# Patient Record
Sex: FEMALE | Race: WHITE | NOT HISPANIC OR LATINO | Employment: OTHER | URBAN - NONMETROPOLITAN AREA
[De-identification: names, ages, dates, MRNs, and addresses within clinical notes are randomized per-mention and may not be internally consistent; named-entity substitution may affect disease eponyms.]

---

## 2019-03-09 ENCOUNTER — LAB (OUTPATIENT)
Dept: LAB | Facility: HOSPITAL | Age: 69
End: 2019-03-09

## 2019-03-09 ENCOUNTER — TRANSCRIBE ORDERS (OUTPATIENT)
Dept: LAB | Facility: HOSPITAL | Age: 69
End: 2019-03-09

## 2019-03-09 DIAGNOSIS — E78.2 MIXED HYPERLIPIDEMIA: ICD-10-CM

## 2019-03-09 DIAGNOSIS — E55.9 VITAMIN D INSUFFICIENCY: ICD-10-CM

## 2019-03-09 DIAGNOSIS — E78.2 MIXED HYPERLIPIDEMIA: Primary | ICD-10-CM

## 2019-03-09 LAB
ALBUMIN SERPL-MCNC: 4.4 G/DL (ref 3.5–5)
ALBUMIN/GLOB SERPL: 1.3 G/DL (ref 1–2)
ALP SERPL-CCNC: 63 U/L (ref 38–126)
ALT SERPL W P-5'-P-CCNC: 19 U/L (ref 13–69)
ANION GAP SERPL CALCULATED.3IONS-SCNC: 10.7 MMOL/L (ref 10–20)
AST SERPL-CCNC: 31 U/L (ref 15–46)
BILIRUB SERPL-MCNC: 0.2 MG/DL (ref 0.2–1.3)
BUN BLD-MCNC: 10 MG/DL (ref 7–20)
BUN/CREAT SERPL: 16.7 (ref 7.1–23.5)
CALCIUM SPEC-SCNC: 9.9 MG/DL (ref 8.4–10.2)
CHLORIDE SERPL-SCNC: 102 MMOL/L (ref 98–107)
CO2 SERPL-SCNC: 31 MMOL/L (ref 26–30)
CREAT BLD-MCNC: 0.6 MG/DL (ref 0.6–1.3)
GFR SERPL CREATININE-BSD FRML MDRD: 99 ML/MIN/1.73
GLOBULIN UR ELPH-MCNC: 3.3 GM/DL
GLUCOSE BLD-MCNC: 84 MG/DL (ref 74–98)
NT-PROBNP SERPL-MCNC: 61.6 PG/ML (ref 0–125)
POTASSIUM BLD-SCNC: 3.7 MMOL/L (ref 3.5–5.1)
PROT SERPL-MCNC: 7.7 G/DL (ref 6.3–8.2)
SODIUM BLD-SCNC: 140 MMOL/L (ref 137–145)

## 2019-03-09 PROCEDURE — 80053 COMPREHEN METABOLIC PANEL: CPT

## 2019-03-09 PROCEDURE — 83880 ASSAY OF NATRIURETIC PEPTIDE: CPT

## 2019-03-09 PROCEDURE — 36415 COLL VENOUS BLD VENIPUNCTURE: CPT

## 2019-03-09 PROCEDURE — 82306 VITAMIN D 25 HYDROXY: CPT

## 2019-03-11 LAB — 25(OH)D3 SERPL-MCNC: 38 NG/ML

## 2019-03-19 ENCOUNTER — TRANSCRIBE ORDERS (OUTPATIENT)
Dept: ADMINISTRATIVE | Facility: HOSPITAL | Age: 69
End: 2019-03-19

## 2019-03-19 ENCOUNTER — HOSPITAL ENCOUNTER (OUTPATIENT)
Dept: GENERAL RADIOLOGY | Facility: HOSPITAL | Age: 69
Discharge: HOME | End: 2019-03-19
Admitting: FAMILY MEDICINE

## 2019-03-19 DIAGNOSIS — M84.30XA STRESS FRACTURE, UNSPECIFIED SITE, INITIAL ENCOUNTER: Primary | ICD-10-CM

## 2019-03-19 PROCEDURE — 73630 X-RAY EXAM OF FOOT: CPT

## 2019-10-03 ENCOUNTER — TELEPHONE (OUTPATIENT)
Dept: NEUROSURGERY | Facility: CLINIC | Age: 69
End: 2019-10-03

## 2019-10-03 NOTE — TELEPHONE ENCOUNTER
Provider:  Chris  Caller: patient  Time of call:   10:08  Phone #:  [phone]  Surgery:  no  Surgery Date:    Last visit:   07/10/14  Next visit: None    BARBARA:         Reason for call:     Patient wants to know if we will order her a tens unit?

## 2020-07-24 ENCOUNTER — APPOINTMENT (OUTPATIENT)
Dept: GENERAL RADIOLOGY | Facility: HOSPITAL | Age: 70
End: 2020-07-24

## 2020-07-24 ENCOUNTER — APPOINTMENT (OUTPATIENT)
Dept: CT IMAGING | Facility: HOSPITAL | Age: 70
End: 2020-07-24

## 2020-07-24 ENCOUNTER — HOSPITAL ENCOUNTER (EMERGENCY)
Facility: HOSPITAL | Age: 70
Discharge: HOME | End: 2020-07-24
Attending: EMERGENCY MEDICINE | Admitting: EMERGENCY MEDICINE

## 2020-07-24 VITALS
HEART RATE: 96 BPM | RESPIRATION RATE: 16 BRPM | DIASTOLIC BLOOD PRESSURE: 87 MMHG | HEIGHT: 65 IN | TEMPERATURE: 98.2 F | OXYGEN SATURATION: 95 % | BODY MASS INDEX: 23.32 KG/M2 | SYSTOLIC BLOOD PRESSURE: 138 MMHG | WEIGHT: 140 LBS

## 2020-07-24 DIAGNOSIS — R11.0 NAUSEA: ICD-10-CM

## 2020-07-24 DIAGNOSIS — Z95.0 PACEMAKER: ICD-10-CM

## 2020-07-24 DIAGNOSIS — R42 EPISODIC LIGHTHEADEDNESS: Primary | ICD-10-CM

## 2020-07-24 LAB
ALBUMIN SERPL-MCNC: 4.2 G/DL (ref 3.5–5.2)
ALBUMIN/GLOB SERPL: 1.1 G/DL
ALP SERPL-CCNC: 78 U/L (ref 39–117)
ALT SERPL W P-5'-P-CCNC: 30 U/L (ref 1–33)
ANION GAP SERPL CALCULATED.3IONS-SCNC: 10 MMOL/L (ref 5–15)
AST SERPL-CCNC: 27 U/L (ref 1–32)
BACTERIA UR QL AUTO: NORMAL /HPF
BASOPHILS # BLD AUTO: 0.05 10*3/MM3 (ref 0–0.2)
BASOPHILS NFR BLD AUTO: 0.6 % (ref 0–1.5)
BILIRUB SERPL-MCNC: 0.2 MG/DL (ref 0–1.2)
BILIRUB UR QL STRIP: NEGATIVE
BUN SERPL-MCNC: 12 MG/DL (ref 8–23)
BUN/CREAT SERPL: 17.4 (ref 7–25)
CALCIUM SPEC-SCNC: 9.5 MG/DL (ref 8.6–10.5)
CHLORIDE SERPL-SCNC: 102 MMOL/L (ref 98–107)
CLARITY UR: CLEAR
CO2 SERPL-SCNC: 27 MMOL/L (ref 22–29)
COLOR UR: YELLOW
CREAT SERPL-MCNC: 0.69 MG/DL (ref 0.57–1)
DEPRECATED RDW RBC AUTO: 42.9 FL (ref 37–54)
EOSINOPHIL # BLD AUTO: 0.11 10*3/MM3 (ref 0–0.4)
EOSINOPHIL NFR BLD AUTO: 1.3 % (ref 0.3–6.2)
ERYTHROCYTE [DISTWIDTH] IN BLOOD BY AUTOMATED COUNT: 12.4 % (ref 12.3–15.4)
GFR SERPL CREATININE-BSD FRML MDRD: 84 ML/MIN/1.73
GLOBULIN UR ELPH-MCNC: 3.7 GM/DL
GLUCOSE SERPL-MCNC: 101 MG/DL (ref 65–99)
GLUCOSE UR STRIP-MCNC: NEGATIVE MG/DL
HCT VFR BLD AUTO: 47.5 % (ref 34–46.6)
HGB BLD-MCNC: 15.2 G/DL (ref 12–15.9)
HGB UR QL STRIP.AUTO: ABNORMAL
HOLD SPECIMEN: NORMAL
HOLD SPECIMEN: NORMAL
HYALINE CASTS UR QL AUTO: NORMAL /LPF
IMM GRANULOCYTES # BLD AUTO: 0.02 10*3/MM3 (ref 0–0.05)
IMM GRANULOCYTES NFR BLD AUTO: 0.2 % (ref 0–0.5)
KETONES UR QL STRIP: NEGATIVE
LEUKOCYTE ESTERASE UR QL STRIP.AUTO: NEGATIVE
LYMPHOCYTES # BLD AUTO: 1.87 10*3/MM3 (ref 0.7–3.1)
LYMPHOCYTES NFR BLD AUTO: 22.1 % (ref 19.6–45.3)
MAGNESIUM SERPL-MCNC: 2.3 MG/DL (ref 1.6–2.4)
MCH RBC QN AUTO: 30.2 PG (ref 26.6–33)
MCHC RBC AUTO-ENTMCNC: 32 G/DL (ref 31.5–35.7)
MCV RBC AUTO: 94.2 FL (ref 79–97)
MONOCYTES # BLD AUTO: 0.54 10*3/MM3 (ref 0.1–0.9)
MONOCYTES NFR BLD AUTO: 6.4 % (ref 5–12)
NEUTROPHILS NFR BLD AUTO: 5.89 10*3/MM3 (ref 1.7–7)
NEUTROPHILS NFR BLD AUTO: 69.4 % (ref 42.7–76)
NITRITE UR QL STRIP: NEGATIVE
NRBC BLD AUTO-RTO: 0 /100 WBC (ref 0–0.2)
PH UR STRIP.AUTO: 5.5 [PH] (ref 5–8)
PLATELET # BLD AUTO: 412 10*3/MM3 (ref 140–450)
PMV BLD AUTO: 8.9 FL (ref 6–12)
POTASSIUM SERPL-SCNC: 4.4 MMOL/L (ref 3.5–5.2)
PROT SERPL-MCNC: 7.9 G/DL (ref 6–8.5)
PROT UR QL STRIP: NEGATIVE
RBC # BLD AUTO: 5.04 10*6/MM3 (ref 3.77–5.28)
RBC # UR: NORMAL /HPF
REF LAB TEST METHOD: NORMAL
SODIUM SERPL-SCNC: 139 MMOL/L (ref 136–145)
SP GR UR STRIP: 1.01 (ref 1–1.03)
SQUAMOUS #/AREA URNS HPF: NORMAL /HPF
TROPONIN T SERPL-MCNC: <0.01 NG/ML (ref 0–0.03)
UROBILINOGEN UR QL STRIP: ABNORMAL
WBC # BLD AUTO: 8.48 10*3/MM3 (ref 3.4–10.8)
WBC UR QL AUTO: NORMAL /HPF
WHOLE BLOOD HOLD SPECIMEN: NORMAL
WHOLE BLOOD HOLD SPECIMEN: NORMAL

## 2020-07-24 PROCEDURE — 71045 X-RAY EXAM CHEST 1 VIEW: CPT

## 2020-07-24 PROCEDURE — 0 IOPAMIDOL PER 1 ML: Performed by: EMERGENCY MEDICINE

## 2020-07-24 PROCEDURE — 80053 COMPREHEN METABOLIC PANEL: CPT | Performed by: EMERGENCY MEDICINE

## 2020-07-24 PROCEDURE — 70450 CT HEAD/BRAIN W/O DYE: CPT

## 2020-07-24 PROCEDURE — 99284 EMERGENCY DEPT VISIT MOD MDM: CPT

## 2020-07-24 PROCEDURE — 71275 CT ANGIOGRAPHY CHEST: CPT

## 2020-07-24 PROCEDURE — 84484 ASSAY OF TROPONIN QUANT: CPT | Performed by: EMERGENCY MEDICINE

## 2020-07-24 PROCEDURE — 85025 COMPLETE CBC W/AUTO DIFF WBC: CPT

## 2020-07-24 PROCEDURE — 81001 URINALYSIS AUTO W/SCOPE: CPT | Performed by: EMERGENCY MEDICINE

## 2020-07-24 PROCEDURE — 83735 ASSAY OF MAGNESIUM: CPT | Performed by: EMERGENCY MEDICINE

## 2020-07-24 PROCEDURE — 93005 ELECTROCARDIOGRAM TRACING: CPT | Performed by: EMERGENCY MEDICINE

## 2020-07-24 PROCEDURE — 93005 ELECTROCARDIOGRAM TRACING: CPT

## 2020-07-24 RX ORDER — ONDANSETRON 4 MG/1
4 TABLET, ORALLY DISINTEGRATING ORAL EVERY 6 HOURS PRN
Qty: 15 TABLET | Refills: 0 | Status: SHIPPED | OUTPATIENT
Start: 2020-07-24 | End: 2020-08-21

## 2020-07-24 RX ORDER — SODIUM CHLORIDE 0.9 % (FLUSH) 0.9 %
10 SYRINGE (ML) INJECTION AS NEEDED
Status: DISCONTINUED | OUTPATIENT
Start: 2020-07-24 | End: 2020-07-24 | Stop reason: HOSPADM

## 2020-07-24 RX ADMIN — SODIUM CHLORIDE 500 ML: 9 INJECTION, SOLUTION INTRAVENOUS at 18:48

## 2020-07-24 RX ADMIN — IOPAMIDOL 70 ML: 755 INJECTION, SOLUTION INTRAVENOUS at 18:56

## 2020-07-25 NOTE — ED PROVIDER NOTES
"Subjective   Pt is a 68 yo female presenting to ED with complaints of dizziness. Pt with hx of recent placement of a pacemaker on 7-14-20 with Dr. Back at [facility]. Per records patient had a 2:1 block and a heart rate in the low 30s. During procedure patient developed a \"small right ventricular perforation\" and a pericardial drain was placed. The drain was removed after 2 days and patient was discharged home on Colchicine. Patient reports she has been doing well until today she has had several episodes of lightheadedness with nausea and some SOB that last a few minutes. Son reports her HR had been running in the 70-80s but today having episodes of in the 90's to 100's. Pt denies any current dizziness or CP in ED. She denies syncope, fever, chills, cough, V/D, abdominal pain or leg swelling. She denies redness, swelling or pain around the pacemaker site. She denies hx of heart cath or cardiac stents. No reports of confusion, slurred speech, vision changes, numbness, tingling or weakness. Patient and son explain they are planning on f/u with [facility] instead of going back to Dr. Back. PMHx significant for seasonal allergies and heart block with pacemaker. Pt denies tobacco, drug or ETOH use.       History provided by:  Patient, medical records and relative  Dizziness   Quality:  Lightheadedness  Severity:  Mild  Onset quality:  Gradual  Duration:  1 day  Timing:  Intermittent  Progression:  Waxing and waning  Chronicity:  New  Relieved by:  Nothing  Worsened by:  Nothing  Ineffective treatments:  None tried  Associated symptoms: nausea    Associated symptoms: no blood in stool, no chest pain, no diarrhea, no headaches, no palpitations, no shortness of breath, no syncope, no tinnitus, no vision changes, no vomiting and no weakness    Risk factors: no heart disease and no hx of stroke        Review of Systems   Constitutional: Negative for fever.   HENT: Negative for congestion, sinus pain, sore " throat and tinnitus.    Eyes: Negative for visual disturbance.   Respiratory: Negative for shortness of breath.    Cardiovascular: Negative for chest pain, palpitations, leg swelling and syncope.   Gastrointestinal: Positive for nausea. Negative for abdominal pain, blood in stool, diarrhea and vomiting.   Genitourinary: Negative for difficulty urinating, dysuria, flank pain and frequency.   Musculoskeletal: Negative for arthralgias and back pain.   Skin: Negative for wound.   Neurological: Positive for dizziness. Negative for syncope, speech difficulty, weakness, numbness and headaches.   Psychiatric/Behavioral: Negative for confusion.   All other systems reviewed and are negative.      No past medical history on file.    No Known Allergies    No past surgical history on file.    No family history on file.    Social History     Socioeconomic History   • Marital status:      Spouse name: Not on file   • Number of children: Not on file   • Years of education: Not on file   • Highest education level: Not on file           Objective   Physical Exam   Constitutional: She is oriented to person, place, and time. Vital signs are normal. She appears well-developed.   HENT:   Head: Atraumatic.   Nose: Nose normal.   Mouth/Throat: Mucous membranes are normal.   Eyes: Pupils are equal, round, and reactive to light. Conjunctivae, EOM and lids are normal.   Neck: Normal range of motion. Neck supple.   Cardiovascular: Normal rate, regular rhythm and normal heart sounds.   Pulmonary/Chest: Effort normal and breath sounds normal. She has no wheezes.   Pacemaker site left chest healing well. No erythema or edema.    Abdominal: Soft. She exhibits no distension. There is no tenderness. There is no rebound and no guarding.   Musculoskeletal: Normal range of motion. She exhibits no edema or tenderness.   Neurological: She is alert and oriented to person, place, and time. No sensory deficit.   Skin: Skin is warm and dry. No rash  noted. No erythema.   Psychiatric: She has a normal mood and affect. Her speech is normal and behavior is normal.   Nursing note and vitals reviewed.      Procedures           ED Course      Discussed patient with Dr. Abbott who is agreeable with ED course.     Consulted Cardiology Dr. Iverson. He reviewed patients ED workup, EKG and CTA. He recommended outpatient f/u with cardiology.     Re-examined patient several times in ED. Pt resting and feeling better. No dizziness or CP in ED. Patient wanting to go home. Will dc home and refer to Cardiology.     Recent Results (from the past 24 hour(s))   Comprehensive Metabolic Panel    Collection Time: 07/24/20  5:18 PM   Result Value Ref Range    Glucose 101 (H) 65 - 99 mg/dL    BUN 12 8 - 23 mg/dL    Creatinine 0.69 0.57 - 1.00 mg/dL    Sodium 139 136 - 145 mmol/L    Potassium 4.4 3.5 - 5.2 mmol/L    Chloride 102 98 - 107 mmol/L    CO2 27.0 22.0 - 29.0 mmol/L    Calcium 9.5 8.6 - 10.5 mg/dL    Total Protein 7.9 6.0 - 8.5 g/dL    Albumin 4.20 3.50 - 5.20 g/dL    ALT (SGPT) 30 1 - 33 U/L    AST (SGOT) 27 1 - 32 U/L    Alkaline Phosphatase 78 39 - 117 U/L    Total Bilirubin 0.2 0.0 - 1.2 mg/dL    eGFR Non African Amer 84 >60 mL/min/1.73    Globulin 3.7 gm/dL    A/G Ratio 1.1 g/dL    BUN/Creatinine Ratio 17.4 7.0 - 25.0    Anion Gap 10.0 5.0 - 15.0 mmol/L   Troponin    Collection Time: 07/24/20  5:18 PM   Result Value Ref Range    Troponin T <0.010 0.000 - 0.030 ng/mL   Magnesium    Collection Time: 07/24/20  5:18 PM   Result Value Ref Range    Magnesium 2.3 1.6 - 2.4 mg/dL   Light Blue Top    Collection Time: 07/24/20  5:18 PM   Result Value Ref Range    Extra Tube hold for add-on    Green Top (Gel)    Collection Time: 07/24/20  5:18 PM   Result Value Ref Range    Extra Tube Hold for add-ons.    Lavender Top    Collection Time: 07/24/20  5:18 PM   Result Value Ref Range    Extra Tube hold for add-on    Gold Top - SST    Collection Time: 07/24/20  5:18 PM   Result Value  Ref Range    Extra Tube Hold for add-ons.    CBC Auto Differential    Collection Time: 07/24/20  5:18 PM   Result Value Ref Range    WBC 8.48 3.40 - 10.80 10*3/mm3    RBC 5.04 3.77 - 5.28 10*6/mm3    Hemoglobin 15.2 12.0 - 15.9 g/dL    Hematocrit 47.5 (H) 34.0 - 46.6 %    MCV 94.2 79.0 - 97.0 fL    MCH 30.2 26.6 - 33.0 pg    MCHC 32.0 31.5 - 35.7 g/dL    RDW 12.4 12.3 - 15.4 %    RDW-SD 42.9 37.0 - 54.0 fl    MPV 8.9 6.0 - 12.0 fL    Platelets 412 140 - 450 10*3/mm3    Neutrophil % 69.4 42.7 - 76.0 %    Lymphocyte % 22.1 19.6 - 45.3 %    Monocyte % 6.4 5.0 - 12.0 %    Eosinophil % 1.3 0.3 - 6.2 %    Basophil % 0.6 0.0 - 1.5 %    Immature Grans % 0.2 0.0 - 0.5 %    Neutrophils, Absolute 5.89 1.70 - 7.00 10*3/mm3    Lymphocytes, Absolute 1.87 0.70 - 3.10 10*3/mm3    Monocytes, Absolute 0.54 0.10 - 0.90 10*3/mm3    Eosinophils, Absolute 0.11 0.00 - 0.40 10*3/mm3    Basophils, Absolute 0.05 0.00 - 0.20 10*3/mm3    Immature Grans, Absolute 0.02 0.00 - 0.05 10*3/mm3    nRBC 0.0 0.0 - 0.2 /100 WBC   Urinalysis With Microscopic If Indicated (No Culture) - Urine, Clean Catch    Collection Time: 07/24/20  5:36 PM   Result Value Ref Range    Color, UA Yellow Yellow, Straw    Appearance, UA Clear Clear    pH, UA 5.5 5.0 - 8.0    Specific Gravity, UA 1.008 1.001 - 1.030    Glucose, UA Negative Negative    Ketones, UA Negative Negative    Bilirubin, UA Negative Negative    Blood, UA Trace (A) Negative    Protein, UA Negative Negative    Leuk Esterase, UA Negative Negative    Nitrite, UA Negative Negative    Urobilinogen, UA 0.2 E.U./dL 0.2 - 1.0 E.U./dL   Urinalysis, Microscopic Only - Urine, Clean Catch    Collection Time: 07/24/20  5:36 PM   Result Value Ref Range    RBC, UA 0-2 None Seen, 0-2 /HPF    WBC, UA None Seen None Seen, 0-2 /HPF    Bacteria, UA None Seen None Seen, Trace /HPF    Squamous Epithelial Cells, UA None Seen None Seen, 0-2 /HPF    Hyaline Casts, UA None Seen 0 - 6 /LPF    Methodology Automated Microscopy       Note: In addition to lab results from this visit, the labs listed above may include labs taken at another facility or during a different encounter within the last 24 hours. Please correlate lab times with ED admission and discharge times for further clarification of the services performed during this visit.    CT Head Without Contrast   Final Result   Normal, negative unenhanced head CT.               Signer Name: PAVITHRA Strickland MD    Signed: 7/24/2020 7:34 PM    Workstation Name: [facility]     Radiology [facility]      CT Angiogram Chest   Final Result   Pacemaker over the left anterior chest with lead in expected position.      No acute intrathoracic abnormality. In particular no evidence of pulmonary embolus.      Signer Name: PAVITHRA Strickland MD    Signed: 7/24/2020 7:33 PM    Workstation Name: [facility]      XR Chest 1 View   Preliminary Result   No evidence of pneumothorax or other active chest disease.       DICTATED:   07/24/2020   EDITED/ls :   07/24/2020                  Vitals:    07/24/20 2029 07/24/20 2030 07/24/20 2100 07/24/20 2130   BP:  131/81 141/81 138/87   BP Location:       Patient Position:       Pulse: 102  92 96   Resp:       Temp:       TempSrc:       SpO2: 94%  95% 95%   Weight:       Height:         Medications   sodium chloride 0.9 % flush 10 mL (has no administration in time range)   sodium chloride 0.9 % bolus 500 mL (0 mL Intravenous Stopped 7/24/20 1929)   iopamidol (ISOVUE-370) 76 % injection 100 mL (70 mL Intravenous Given 7/24/20 1856)     ECG/EMG Results (last 24 hours)     Procedure Component Value Units Date/Time    ECG 12 Lead [[id number]] Collected:  07/24/20 1718     Updated:  07/24/20 1718        ECG 12 Lead               COVID-19 RISK SCREEN     1. Has the patient had close contact without PPE with a lab confirmed COVID-19 (+) person or a person under investigation (PUI) for COVID-19 infection?  -- No      2. Has the patient had respiratory symptoms, worsened/new cough and/or SOA, unexplained fever, or sudden loss of smell and/or taste in the past 7 days? --  No    3. Does the patient have baseline higher exposure risk such as working in healthcare field, currently residing in healthcare facility, or ongoing hemodialysis?  --  No          DISCHARGE    Patient discharged in stable condition.    Reviewed implications of results, diagnosis, meds, responsibility to follow up, warning signs and symptoms of possible worsening, potential complications and reasons to return to ER.    Patient/Family voiced understanding of above instructions.    Discussed plan for discharge, as there is no emergent indication for admission.  Pt/family is agreeable and understands need for follow up and possible repeat testing.  Pt/family is aware that discharge does not mean that nothing is wrong but that it indicates no emergency is currently present that requires admission and they must continue care with follow-up as given below or with a physician of their choice.     FOLLOW-UP  Kajal Snyder MD  [address] [place] [address]  [address]  [facility] [address] [phone]    Schedule an appointment as soon as possible for a visit       Gerard Iverson MD  [address]  [facility] [address]  [place] [id number]  [phone]    Schedule an appointment as soon as possible for a visit       [facility] Emergency Department  [address] [facility] [id number] [phone]    If symptoms worsen    [facility]  [address] [place], [place] [address]  [facility] [address] [phone]             Medication List      New Prescriptions    ondansetron ODT 4 MG disintegrating tablet  Commonly known as:  ZOFRAN-ODT  Place 1 tablet on the tongue Every 6 (Six) Hours As Needed for Nausea for   up to 15 doses.                                          MDM    Final diagnoses:   Episodic lightheadedness   Pacemaker    Nausea            Suzette Huff PA  07/24/20 [id number]

## 2020-08-03 NOTE — PROGRESS NOTES
[facility]  [facility] Valve Center      08/04/2020         Haven Koenig  [address] [place] [address]  [unfilled]    1950    Kajal Snyder MD    Haven Koenig is a 69 y.o. female.      Subjective:     Chief Complaint:  Dizziness and Establish Care       HPI   69-year-old female with history of symptomatic bradycardia/heart block status post permanent pacemaker on 7/14/2020 by Dr. Back at [facility] who presents today for evaluation of dizziness at the request of Dr. Abbott.  Patient presented the ED on 7/24 with complaints of dizziness.  Patient had permanent pacemaker on 7/14/2020 for 2-1 heart block with a heart rate in the low 30s.  The procedure was complicated by a small right ventricular perforation and a pericardial drain was placed.  The pain was removed after 2 days the patient was discharged home on colchicine.  Patient was discharged feeling well until the day of the ED visit when she started having several episodes of lightheadedness with associated nausea and shortness of breath.  CTA chest showed normal lead position with no other significant abnormalities.  CT head negative.  She reports that dizziness has now resolved.  She has no shortness of breath or chest pain.  She has some residual soreness at the pacemaker site.  Overall, she is very active and healthy.  Was walking 3 miles a day prior to symptomatic bradycardia.  She has went on a 1 mile walk and is tolerated it well.  Her daughter is with her today reports that the only reason for the visit is to get established with Dr. Iverson for a second opinion    Patient Active Problem List   Diagnosis   • Pericardial effusion, acute   • High degree atrioventricular block       Past Medical History:   Diagnosis Date   • Arrhythmia    • H/O active rheumatic fever    • Pacemaker    • Wears glasses        Past Surgical History:   Procedure Laterality Date   • APPENDECTOMY     • BACK SURGERY  2015   • CATARACT  EXTRACTION     • TONSILLECTOMY         Family History   Problem Relation Age of Onset   • No Known Problems Father    • Arrhythmia Brother    • Cancer Brother    • No Known Problems Maternal Grandfather    • No Known Problems Paternal Grandmother    • Arrhythmia Paternal Grandfather    • Stroke Paternal Grandfather        Social History     Socioeconomic History   • Marital status:      Spouse name: Not on file   • Number of children: Not on file   • Years of education: Not on file   • Highest education level: Not on file   Tobacco Use   • Smoking status: Never Smoker   • Smokeless tobacco: Never Used   Substance and Sexual Activity   • Alcohol use: Never     Frequency: Never   • Drug use: Defer   • Sexual activity: Defer   Social History Narrative    Caffeine coffee 3 servings dt pepsi and dt coke and tea         No Known Allergies      Current Outpatient Medications:   •  loratadine (Claritin) 10 MG tablet, Take 10 mg by mouth Daily., Disp: , Rfl:   •  ondansetron ODT (ZOFRAN-ODT) 4 MG disintegrating tablet, Place 1 tablet on the tongue Every 6 (Six) Hours As Needed for Nausea for up to 15 doses., Disp: 15 tablet, Rfl: 0  •  venlafaxine XR (EFFEXOR-XR) 75 MG 24 hr capsule, Take 75 mg by mouth Daily., Disp: , Rfl:     The following portions of the patient's history were reviewed today and updated as appropriate: allergies, current medications, past family history, past medical history, past social history, past surgical history and problem list     Review of Systems   Constitution: Negative for chills and fever.   HENT: Negative.    Eyes: Negative.    Cardiovascular: Negative for chest pain, claudication, cyanosis, dyspnea on exertion, irregular heartbeat, leg swelling, near-syncope, orthopnea, palpitations, paroxysmal nocturnal dyspnea and syncope.   Respiratory: Negative for cough, shortness of breath and snoring.    Endocrine: Negative.    Hematologic/Lymphatic: Does not bruise/bleed easily.   Skin:  "Negative for poor wound healing.   Musculoskeletal: Negative.    Gastrointestinal: Negative for abdominal pain, heartburn, hematemesis, melena, nausea and vomiting.   Genitourinary: Negative.  Negative for hematuria.   Neurological: Negative for dizziness and light-headedness.   Psychiatric/Behavioral: Negative.    Allergic/Immunologic: Negative.        Objective:     Vitals:    08/04/20 1502 08/04/20 1506 08/04/20 1507   BP: 138/73 140/68 168/75   BP Location: Right arm Left arm Left arm   Patient Position: Sitting Standing Sitting   Cuff Size: Adult Adult Adult   Pulse: 88 94 82   Resp:   20   Temp:   97.1 °F (36.2 °C)   TempSrc:   Temporal   SpO2: 97% 98% 98%   Weight:   64 kg (141 lb)   Height:   165.1 cm (65\")       Body mass index is 23.46 kg/m².    Physical Exam   Constitutional: She is oriented to person, place, and time. She appears well-developed and well-nourished. No distress.   HENT:   Head: Normocephalic.   Eyes: Pupils are equal, round, and reactive to light. Conjunctivae are normal.   Neck: Neck supple. No JVD present. No thyromegaly present.   Cardiovascular: Normal rate, regular rhythm, normal heart sounds and intact distal pulses. Exam reveals no gallop and no friction rub.   No murmur heard.  Pulmonary/Chest: Effort normal and breath sounds normal. No respiratory distress. She has no wheezes. She has no rales. She exhibits no tenderness.   Abdominal: Soft. Bowel sounds are normal.   Musculoskeletal: Normal range of motion. She exhibits no edema.   Neurological: She is alert and oriented to person, place, and time.   Skin: Skin is warm and dry.   Pacemaker site is clean,dry and intact   Psychiatric: She has a normal mood and affect. Her behavior is normal. Thought content normal.   Vitals reviewed.      Lab and Diagnostic Review:  Echo 7/14/2020 showed EF 65%.  A pericardial effusion was noted during pacemaker insertion measuring 1.28 cm.  Pericardiocentesis performed with no evidence of residual " effusion post procedure.    Echo 7/15/2020 showed EF 55 to 60% with no evidence of pericardial effusion.  RV pacing induced septal wobble noted.  No significant valvulopathy  Results for orders placed or performed during the hospital encounter of 07/24/20   Comprehensive Metabolic Panel   Result Value Ref Range    Glucose 101 (H) 65 - 99 mg/dL    BUN 12 8 - 23 mg/dL    Creatinine 0.69 0.57 - 1.00 mg/dL    Sodium 139 136 - 145 mmol/L    Potassium 4.4 3.5 - 5.2 mmol/L    Chloride 102 98 - 107 mmol/L    CO2 27.0 22.0 - 29.0 mmol/L    Calcium 9.5 8.6 - 10.5 mg/dL    Total Protein 7.9 6.0 - 8.5 g/dL    Albumin 4.20 3.50 - 5.20 g/dL    ALT (SGPT) 30 1 - 33 U/L    AST (SGOT) 27 1 - 32 U/L    Alkaline Phosphatase 78 39 - 117 U/L    Total Bilirubin 0.2 0.0 - 1.2 mg/dL    eGFR Non African Amer 84 >60 mL/min/1.73    Globulin 3.7 gm/dL    A/G Ratio 1.1 g/dL    BUN/Creatinine Ratio 17.4 7.0 - 25.0    Anion Gap 10.0 5.0 - 15.0 mmol/L   Troponin   Result Value Ref Range    Troponin T <0.010 0.000 - 0.030 ng/mL   Magnesium   Result Value Ref Range    Magnesium 2.3 1.6 - 2.4 mg/dL   Urinalysis With Microscopic If Indicated (No Culture) - Urine, Clean Catch   Result Value Ref Range    Color, UA Yellow Yellow, Straw    Appearance, UA Clear Clear    pH, UA 5.5 5.0 - 8.0    Specific Gravity, UA 1.008 1.001 - 1.030    Glucose, UA Negative Negative    Ketones, UA Negative Negative    Bilirubin, UA Negative Negative    Blood, UA Trace (A) Negative    Protein, UA Negative Negative    Leuk Esterase, UA Negative Negative    Nitrite, UA Negative Negative    Urobilinogen, UA 0.2 E.U./dL 0.2 - 1.0 E.U./dL   CBC Auto Differential   Result Value Ref Range    WBC 8.48 3.40 - 10.80 10*3/mm3    RBC 5.04 3.77 - 5.28 10*6/mm3    Hemoglobin 15.2 12.0 - 15.9 g/dL    Hematocrit 47.5 (H) 34.0 - 46.6 %    MCV 94.2 79.0 - 97.0 fL    MCH 30.2 26.6 - 33.0 pg    MCHC 32.0 31.5 - 35.7 g/dL    RDW 12.4 12.3 - 15.4 %    RDW-SD 42.9 37.0 - 54.0 fl    MPV 8.9 6.0 -  12.0 fL    Platelets 412 140 - 450 10*3/mm3    Neutrophil % 69.4 42.7 - 76.0 %    Lymphocyte % 22.1 19.6 - 45.3 %    Monocyte % 6.4 5.0 - 12.0 %    Eosinophil % 1.3 0.3 - 6.2 %    Basophil % 0.6 0.0 - 1.5 %    Immature Grans % 0.2 0.0 - 0.5 %    Neutrophils, Absolute 5.89 1.70 - 7.00 10*3/mm3    Lymphocytes, Absolute 1.87 0.70 - 3.10 10*3/mm3    Monocytes, Absolute 0.54 0.10 - 0.90 10*3/mm3    Eosinophils, Absolute 0.11 0.00 - 0.40 10*3/mm3    Basophils, Absolute 0.05 0.00 - 0.20 10*3/mm3    Immature Grans, Absolute 0.02 0.00 - 0.05 10*3/mm3    nRBC 0.0 0.0 - 0.2 /100 WBC   Urinalysis, Microscopic Only - Urine, Clean Catch   Result Value Ref Range    RBC, UA 0-2 None Seen, 0-2 /HPF    WBC, UA None Seen None Seen, 0-2 /HPF    Bacteria, UA None Seen None Seen, Trace /HPF    Squamous Epithelial Cells, UA None Seen None Seen, 0-2 /HPF    Hyaline Casts, UA None Seen 0 - 6 /LPF    Methodology Automated Microscopy      EKG 7/24/20 Atrial-sensed ventricular-paced rhythm with occasional premature  ventricular complexes  Abnormal ECG  No previous ECGs available    [employer] Device check showed normal functioning device with no events.  87% V paced, less than 1% a paced  Assessment and Plan:   1. Dizziness  Symptoms currently resolved, no events on PPM    2. High degree atrioventricular block/symptomatic bradycardia  S/p PPM    3. Pericardial effusion, acute  Small RV perforation during PPM implant s/p pericardial drain with resolution of pericardial effusion per echo    4. Elevated blood-pressure reading, without diagnosis of hypertension  Elevated today but she reports that she is been checking at home and systolic blood pressures are mostly in the 130s.  Continue to monitor      We will get patient established with Dr.McCotter VALLECILLO with [facility]          It has been a pleasure to participate in the care of this patient.  Patient was instructed to call the [facility] Center with any questions, concerns, or  worsening symptoms.    *Please note that portions of this note were completed with a voice recognition program. Efforts were made to edit the dictations, but occasionally words are mistranscribed.

## 2020-08-04 ENCOUNTER — OFFICE VISIT (OUTPATIENT)
Dept: CARDIOLOGY | Facility: HOSPITAL | Age: 70
End: 2020-08-04

## 2020-08-04 VITALS
HEART RATE: 82 BPM | SYSTOLIC BLOOD PRESSURE: 168 MMHG | HEIGHT: 65 IN | TEMPERATURE: 97.1 F | WEIGHT: 141 LBS | BODY MASS INDEX: 23.49 KG/M2 | OXYGEN SATURATION: 98 % | RESPIRATION RATE: 20 BRPM | DIASTOLIC BLOOD PRESSURE: 75 MMHG

## 2020-08-04 DIAGNOSIS — I44.39 HIGH DEGREE ATRIOVENTRICULAR BLOCK: ICD-10-CM

## 2020-08-04 DIAGNOSIS — R03.0 ELEVATED BLOOD-PRESSURE READING, WITHOUT DIAGNOSIS OF HYPERTENSION: ICD-10-CM

## 2020-08-04 DIAGNOSIS — I30.9 PERICARDIAL EFFUSION, ACUTE: ICD-10-CM

## 2020-08-04 DIAGNOSIS — R42 DIZZINESS: Primary | ICD-10-CM

## 2020-08-04 PROCEDURE — 99203 OFFICE O/P NEW LOW 30 MIN: CPT | Performed by: NURSE PRACTITIONER

## 2020-08-04 RX ORDER — LORATADINE 10 MG/1
10 TABLET ORAL DAILY
COMMUNITY

## 2020-08-04 RX ORDER — VENLAFAXINE HYDROCHLORIDE 75 MG/1
75 CAPSULE, EXTENDED RELEASE ORAL DAILY
COMMUNITY

## 2020-08-05 DIAGNOSIS — Z95.0 PRESENCE OF CARDIAC PACEMAKER: ICD-10-CM

## 2020-08-05 DIAGNOSIS — I44.39 HIGH DEGREE ATRIOVENTRICULAR BLOCK: Primary | ICD-10-CM

## 2020-08-05 DIAGNOSIS — I30.9 PERICARDIAL EFFUSION, ACUTE: ICD-10-CM

## 2020-08-21 ENCOUNTER — TELEPHONE (OUTPATIENT)
Dept: CARDIOLOGY | Facility: CLINIC | Age: 70
End: 2020-08-21

## 2020-08-21 ENCOUNTER — CONSULT (OUTPATIENT)
Dept: CARDIOLOGY | Facility: CLINIC | Age: 70
End: 2020-08-21

## 2020-08-21 VITALS
BODY MASS INDEX: 25.1 KG/M2 | HEIGHT: 64 IN | HEART RATE: 70 BPM | SYSTOLIC BLOOD PRESSURE: 118 MMHG | WEIGHT: 147 LBS | DIASTOLIC BLOOD PRESSURE: 74 MMHG

## 2020-08-21 DIAGNOSIS — Z95.0 PRESENCE OF CARDIAC PACEMAKER: ICD-10-CM

## 2020-08-21 DIAGNOSIS — I44.39 HIGH DEGREE ATRIOVENTRICULAR BLOCK: ICD-10-CM

## 2020-08-21 DIAGNOSIS — I30.9 PERICARDIAL EFFUSION, ACUTE: ICD-10-CM

## 2020-08-21 PROCEDURE — 93280 PM DEVICE PROGR EVAL DUAL: CPT | Performed by: INTERNAL MEDICINE

## 2020-08-21 PROCEDURE — 99204 OFFICE O/P NEW MOD 45 MIN: CPT | Performed by: INTERNAL MEDICINE

## 2020-08-21 RX ORDER — HYDROXYZINE HYDROCHLORIDE 25 MG/1
25 TABLET, FILM COATED ORAL 3 TIMES DAILY PRN
COMMUNITY
End: 2020-12-06

## 2020-08-21 RX ORDER — PROPRANOLOL HYDROCHLORIDE 20 MG/1
20 TABLET ORAL DAILY
COMMUNITY

## 2020-08-21 NOTE — PROGRESS NOTES
Haven Koenig  1950  PCP: Kajal Snyder MD    SUBJECTIVE:   Haven Koenig is a 69 y.o. female seen for a consultation visit regarding the following:     Chief Complaint:   Chief Complaint   Patient presents with   • Dizziness   • High degree atrioventricular block          Consultation is requested by BASIA Lara* for evaluation of Dizziness and High degree atrioventricular block        History:  This is 69-year-old female with history of symptomatic bradycardia/heart block status post permanent pacemaker on 7/14/2020 by Dr. Back at [facility].  Patient presented the ED on 7/24/2020 with complaints of dizziness.  Patient had permanent pacemaker on 7/14/2020 for 2-1 heart block with a heart rate in the low 30s.  The procedure was complicated by a small right ventricular perforation and a pericardial drain was placed.  The drain was removed after 2 days the patient was discharged home on colchicine.  Patient was discharged feeling well until the day of the ED visit when she started having several episodes of lightheadedness with associated nausea and shortness of breath.  CTA chest showed normal lead position with no other significant abnormalities.  CT head negative.  She reports that dizziness has now resolved.  She has no shortness of breath or chest pain.  She has some residual soreness at the pacemaker site.  Overall, she is very active and healthy.      Cardiac PMH: (Old records have been reviewed and summarized below)  1.  Transient AV/complete heart block  2.  [employer] dual-chamber pacemaker placement-July 2020 - by Dr. Back at [facility].  3.  Pacemaker placement complication with perforation of the RV lead requiring pericardial drain    Past Medical History, Past Surgical History, Family history, Social History, and Medications were all reviewed with the patient today and updated as necessary.       Current Outpatient Medications:   •  hydrOXYzine (ATARAX)  25 MG tablet, Take 25 mg by mouth 3 (Three) Times a Day As Needed for Itching., Disp: , Rfl:   •  loratadine (Claritin) 10 MG tablet, Take 10 mg by mouth Daily., Disp: , Rfl:   •  Multiple Vitamin (MULTI-VITAMIN DAILY PO), Take  by mouth., Disp: , Rfl:   •  propranolol (INDERAL) 20 MG tablet, Take 20 mg by mouth 3 (Three) Times a Day., Disp: , Rfl:   •  venlafaxine XR (EFFEXOR-XR) 75 MG 24 hr capsule, Take 75 mg by mouth Daily., Disp: , Rfl:     No Known Allergies      Past Medical History:   Diagnosis Date   • H/O active rheumatic fever    • Heart block    • Pacemaker    • Wears glasses      Past Surgical History:   Procedure Laterality Date   • APPENDECTOMY     • BACK SURGERY  2015   • CATARACT EXTRACTION     • PACEMAKER IMPLANTATION  2020   • TONSILLECTOMY       Family History   Problem Relation Age of Onset   • No Known Problems Father    • Arrhythmia Brother    • Cancer Brother    • No Known Problems Maternal Grandfather    • No Known Problems Paternal Grandmother    • Arrhythmia Paternal Grandfather    • Stroke Paternal Grandfather      Social History     Tobacco Use   • Smoking status: Never Smoker   • Smokeless tobacco: Never Used   Substance Use Topics   • Alcohol use: Never     Frequency: Never       ROS:    General: no recent weight loss/gain, weakness or fatigue  Skin: no rashes, lumps, or other skin changes  HEENT: no dizziness, lightheadedness, or vision changes  Respiratory: no cough or hemoptysis  Cardiovascular: No palpitations, or tachycardia  Gastrointestinal: no black/tarry stools or diarrhea  Urinary: no change in frequency or urgency  Peripheral Vascular: no claudication or leg cramps  Musculoskeletal: no muscle or joint pain/stiffness  Psychiatric: no depression or excessive stress  Neurological: no sensory or motor loss, no syncope  Hematologic: no anemia, easy bruising or bleeding  Endocrine: no thyroid problems, nor heat or cold intolerance       PHYSICAL EXAM:   /74 (BP Location:  "Left arm, Patient Position: Sitting)   Pulse 70   Ht 162.6 cm (64\")   Wt 66.7 kg (147 lb)   BMI 25.23 kg/m²      Wt Readings from Last 5 Encounters:   08/21/20 66.7 kg (147 lb)   08/04/20 64 kg (141 lb)   07/24/20 63.5 kg (140 lb)     BP Readings from Last 5 Encounters:   08/21/20 118/74   08/04/20 168/75   07/24/20 138/87       General-Well Nourished, Well developed  Eyes - PERRLA  Neck- supple, No mass  CV- regular rate and rhythm, no MRG  Lung- clear bilaterally  Abd- soft, +BS  Musc/skel - Norm strength and range of motion  Skin- warm and dry  Neuro - Alert & Oriented x 3, appropriate mood.    Medical problems and test results were reviewed with the patient today.     Results for orders placed or performed during the hospital encounter of 07/24/20   Comprehensive Metabolic Panel   Result Value Ref Range    Glucose 101 (H) 65 - 99 mg/dL    BUN 12 8 - 23 mg/dL    Creatinine 0.69 0.57 - 1.00 mg/dL    Sodium 139 136 - 145 mmol/L    Potassium 4.4 3.5 - 5.2 mmol/L    Chloride 102 98 - 107 mmol/L    CO2 27.0 22.0 - 29.0 mmol/L    Calcium 9.5 8.6 - 10.5 mg/dL    Total Protein 7.9 6.0 - 8.5 g/dL    Albumin 4.20 3.50 - 5.20 g/dL    ALT (SGPT) 30 1 - 33 U/L    AST (SGOT) 27 1 - 32 U/L    Alkaline Phosphatase 78 39 - 117 U/L    Total Bilirubin 0.2 0.0 - 1.2 mg/dL    eGFR Non African Amer 84 >60 mL/min/1.73    Globulin 3.7 gm/dL    A/G Ratio 1.1 g/dL    BUN/Creatinine Ratio 17.4 7.0 - 25.0    Anion Gap 10.0 5.0 - 15.0 mmol/L   Troponin   Result Value Ref Range    Troponin T <0.010 0.000 - 0.030 ng/mL   Magnesium   Result Value Ref Range    Magnesium 2.3 1.6 - 2.4 mg/dL   Urinalysis With Microscopic If Indicated (No Culture) - Urine, Clean Catch   Result Value Ref Range    Color, UA Yellow Yellow, Straw    Appearance, UA Clear Clear    pH, UA 5.5 5.0 - 8.0    Specific Gravity, UA 1.008 1.001 - 1.030    Glucose, UA Negative Negative    Ketones, UA Negative Negative    Bilirubin, UA Negative Negative    Blood, UA Trace (A) " Negative    Protein, UA Negative Negative    Leuk Esterase, UA Negative Negative    Nitrite, UA Negative Negative    Urobilinogen, UA 0.2 E.U./dL 0.2 - 1.0 E.U./dL   CBC Auto Differential   Result Value Ref Range    WBC 8.48 3.40 - 10.80 10*3/mm3    RBC 5.04 3.77 - 5.28 10*6/mm3    Hemoglobin 15.2 12.0 - 15.9 g/dL    Hematocrit 47.5 (H) 34.0 - 46.6 %    MCV 94.2 79.0 - 97.0 fL    MCH 30.2 26.6 - 33.0 pg    MCHC 32.0 31.5 - 35.7 g/dL    RDW 12.4 12.3 - 15.4 %    RDW-SD 42.9 37.0 - 54.0 fl    MPV 8.9 6.0 - 12.0 fL    Platelets 412 140 - 450 10*3/mm3    Neutrophil % 69.4 42.7 - 76.0 %    Lymphocyte % 22.1 19.6 - 45.3 %    Monocyte % 6.4 5.0 - 12.0 %    Eosinophil % 1.3 0.3 - 6.2 %    Basophil % 0.6 0.0 - 1.5 %    Immature Grans % 0.2 0.0 - 0.5 %    Neutrophils, Absolute 5.89 1.70 - 7.00 10*3/mm3    Lymphocytes, Absolute 1.87 0.70 - 3.10 10*3/mm3    Monocytes, Absolute 0.54 0.10 - 0.90 10*3/mm3    Eosinophils, Absolute 0.11 0.00 - 0.40 10*3/mm3    Basophils, Absolute 0.05 0.00 - 0.20 10*3/mm3    Immature Grans, Absolute 0.02 0.00 - 0.05 10*3/mm3    nRBC 0.0 0.0 - 0.2 /100 WBC   Urinalysis, Microscopic Only - Urine, Clean Catch   Result Value Ref Range    RBC, UA 0-2 None Seen, 0-2 /HPF    WBC, UA None Seen None Seen, 0-2 /HPF    Bacteria, UA None Seen None Seen, Trace /HPF    Squamous Epithelial Cells, UA None Seen None Seen, 0-2 /HPF    Hyaline Casts, UA None Seen 0 - 6 /LPF    Methodology Automated Microscopy    Light Blue Top   Result Value Ref Range    Extra Tube hold for add-on    Green Top (Gel)   Result Value Ref Range    Extra Tube Hold for add-ons.    Lavender Top   Result Value Ref Range    Extra Tube hold for add-on    Gold Top - SST   Result Value Ref Range    Extra Tube Hold for add-ons.          No results found for: CHOL, HDL, HDLC, LDL, LDLC, VLDL    EKG:  (EKG/Tracing has been independently visualized by me and summarized below)      ECG 12 Lead  Date/Time: 8/24/2020 10:00 PM  Performed by: Kishor  Gerard ARSHAD MD  Authorized by: Gerard Iverson MD   Comparison: not compared with previous ECG   Previous ECG: no previous ECG available  Rhythm: sinus rhythm  Conduction: incomplete right bundle branch block  Other findings: non-specific ST-T wave changes    Clinical impression: abnormal EKG            ASSESSMENT and PLAN  1.  Transient AV block with symptomatic bradycardia-status post dual-chamber pacemaker-now is stable  2.  Dual-chamber pacemaker-[employer]-stable function  3.  Cardiac tamponade at the time of pacemaker placement-status post drain-has resolved.    Return in about 6 months (around 2/21/2021) for office visit and device check with [facility].            Gerard Iverson M.D., F.A.C.C, F.H.R.S.  Cardiology/Electrophysiology  08/21/20  11:13

## 2020-08-24 ENCOUNTER — TELEPHONE (OUTPATIENT)
Dept: CARDIOLOGY | Facility: CLINIC | Age: 70
End: 2020-08-24

## 2020-08-24 NOTE — TELEPHONE ENCOUNTER
Daughter is calling to let us know that her mother is still having problems with her PPM. She states that she is still nauseous and weak and dizzy.  She has chest pain and pain in middle of back only when she breathes under her rib cage.. I see that she had an appt on 8/21/20 but I can't see the plan. Will you please advise or call the pt?  [phone].

## 2020-08-25 ENCOUNTER — APPOINTMENT (OUTPATIENT)
Dept: CT IMAGING | Facility: HOSPITAL | Age: 70
End: 2020-08-25

## 2020-08-25 ENCOUNTER — APPOINTMENT (OUTPATIENT)
Dept: CARDIOLOGY | Facility: HOSPITAL | Age: 70
End: 2020-08-25

## 2020-08-25 ENCOUNTER — APPOINTMENT (OUTPATIENT)
Dept: GENERAL RADIOLOGY | Facility: HOSPITAL | Age: 70
End: 2020-08-25

## 2020-08-25 ENCOUNTER — HOSPITAL ENCOUNTER (EMERGENCY)
Facility: HOSPITAL | Age: 70
Discharge: HOME | End: 2020-08-25
Attending: EMERGENCY MEDICINE | Admitting: EMERGENCY MEDICINE

## 2020-08-25 VITALS
SYSTOLIC BLOOD PRESSURE: 90 MMHG | BODY MASS INDEX: 22.82 KG/M2 | WEIGHT: 137 LBS | DIASTOLIC BLOOD PRESSURE: 55 MMHG | RESPIRATION RATE: 18 BRPM | OXYGEN SATURATION: 96 % | TEMPERATURE: 98.6 F | HEART RATE: 98 BPM | HEIGHT: 65 IN

## 2020-08-25 DIAGNOSIS — I31.9 PERICARDITIS, UNSPECIFIED CHRONICITY, UNSPECIFIED TYPE: Primary | ICD-10-CM

## 2020-08-25 LAB
ALBUMIN SERPL-MCNC: 3.8 G/DL (ref 3.5–5.2)
ALBUMIN/GLOB SERPL: 0.9 G/DL
ALP SERPL-CCNC: 117 U/L (ref 39–117)
ALT SERPL W P-5'-P-CCNC: 30 U/L (ref 1–33)
ANION GAP SERPL CALCULATED.3IONS-SCNC: 10 MMOL/L (ref 5–15)
AST SERPL-CCNC: 27 U/L (ref 1–32)
B PARAPERT DNA SPEC QL NAA+PROBE: NOT DETECTED
B PERT DNA SPEC QL NAA+PROBE: NOT DETECTED
BASOPHILS # BLD AUTO: 0.05 10*3/MM3 (ref 0–0.2)
BASOPHILS NFR BLD AUTO: 0.3 % (ref 0–1.5)
BH CV ECHO MEAS - AO MAX PG (FULL): 1.3 MMHG
BH CV ECHO MEAS - AO MAX PG: 4.4 MMHG
BH CV ECHO MEAS - AO ROOT AREA (BSA CORRECTED): 1.8
BH CV ECHO MEAS - AO ROOT AREA: 7.4 CM^2
BH CV ECHO MEAS - AO ROOT DIAM: 3.1 CM
BH CV ECHO MEAS - AO V2 MAX: 104.7 CM/SEC
BH CV ECHO MEAS - AVA(V,A): 2.5 CM^2
BH CV ECHO MEAS - AVA(V,D): 2.5 CM^2
BH CV ECHO MEAS - BSA(HAYCOCK): 1.7 M^2
BH CV ECHO MEAS - BSA: 1.7 M^2
BH CV ECHO MEAS - BZI_BMI: 22.8 KILOGRAMS/M^2
BH CV ECHO MEAS - BZI_METRIC_HEIGHT: 165.1 CM
BH CV ECHO MEAS - BZI_METRIC_WEIGHT: 62.1 KG
BH CV ECHO MEAS - EDV(CUBED): 47.9 ML
BH CV ECHO MEAS - EDV(MOD-SP4): 43 ML
BH CV ECHO MEAS - EDV(TEICH): 55.6 ML
BH CV ECHO MEAS - EF(CUBED): 81.9 %
BH CV ECHO MEAS - EF(MOD-SP4): 53.5 %
BH CV ECHO MEAS - EF(TEICH): 75.4 %
BH CV ECHO MEAS - ESV(CUBED): 8.7 ML
BH CV ECHO MEAS - ESV(MOD-SP2): 36 ML
BH CV ECHO MEAS - ESV(MOD-SP4): 20 ML
BH CV ECHO MEAS - ESV(TEICH): 13.7 ML
BH CV ECHO MEAS - FS: 43.4 %
BH CV ECHO MEAS - IVS/LVPW: 0.96
BH CV ECHO MEAS - IVSD: 0.82 CM
BH CV ECHO MEAS - LA DIMENSION: 2.8 CM
BH CV ECHO MEAS - LA/AO: 0.91
BH CV ECHO MEAS - LAD MAJOR: 3.9 CM
BH CV ECHO MEAS - LAT PEAK E' VEL: 22.7 CM/SEC
BH CV ECHO MEAS - LATERAL E/E' RATIO: 4.2
BH CV ECHO MEAS - LV DIASTOLIC VOL/BSA (35-75): 25.5 ML/M^2
BH CV ECHO MEAS - LV MASS(C)D: 85.4 GRAMS
BH CV ECHO MEAS - LV MASS(C)DI: 50.7 GRAMS/M^2
BH CV ECHO MEAS - LV MAX PG: 3.1 MMHG
BH CV ECHO MEAS - LV MEAN PG: 1.8 MMHG
BH CV ECHO MEAS - LV SYSTOLIC VOL/BSA (12-30): 11.9 ML/M^2
BH CV ECHO MEAS - LV V1 MAX: 87.9 CM/SEC
BH CV ECHO MEAS - LV V1 MEAN: 64.1 CM/SEC
BH CV ECHO MEAS - LV V1 VTI: 12.6 CM
BH CV ECHO MEAS - LVIDD: 3.6 CM
BH CV ECHO MEAS - LVIDS: 2.1 CM
BH CV ECHO MEAS - LVLD AP4: 6.9 CM
BH CV ECHO MEAS - LVLS AP2: 6.5 CM
BH CV ECHO MEAS - LVLS AP4: 5.6 CM
BH CV ECHO MEAS - LVOT AREA (M): 3.1 CM^2
BH CV ECHO MEAS - LVOT AREA: 3 CM^2
BH CV ECHO MEAS - LVOT DIAM: 2 CM
BH CV ECHO MEAS - LVPWD: 0.86 CM
BH CV ECHO MEAS - MED PEAK E' VEL: 15.6 CM/SEC
BH CV ECHO MEAS - MEDIAL E/E' RATIO: 6.2
BH CV ECHO MEAS - MV DEC TIME: 0.17 SEC
BH CV ECHO MEAS - MV E MAX VEL: 97.2 CM/SEC
BH CV ECHO MEAS - PA ACC SLOPE: 611.7 CM/SEC^2
BH CV ECHO MEAS - PA ACC TIME: 0.11 SEC
BH CV ECHO MEAS - PA MAX PG: 3.1 MMHG
BH CV ECHO MEAS - PA PR(ACCEL): 30.7 MMHG
BH CV ECHO MEAS - PA V2 MAX: 87.4 CM/SEC
BH CV ECHO MEAS - RAP SYSTOLE: 3 MMHG
BH CV ECHO MEAS - RVSP: 23 MMHG
BH CV ECHO MEAS - SI(CUBED): 23.3 ML/M^2
BH CV ECHO MEAS - SI(LVOT): 22.8 ML/M^2
BH CV ECHO MEAS - SI(MOD-SP4): 13.7 ML/M^2
BH CV ECHO MEAS - SI(TEICH): 24.9 ML/M^2
BH CV ECHO MEAS - SV(CUBED): 39.2 ML
BH CV ECHO MEAS - SV(LVOT): 38.3 ML
BH CV ECHO MEAS - SV(MOD-SP4): 23 ML
BH CV ECHO MEAS - SV(TEICH): 42 ML
BH CV ECHO MEAS - TAPSE (>1.6): 1.3 CM2
BH CV ECHO MEAS - TR MAX PG: 20 MMHG
BH CV ECHO MEAS - TR MAX VEL: 221.2 CM/SEC
BH CV ECHO MEASUREMENTS AVERAGE E/E' RATIO: 5.08
BH CV VAS BP LEFT ARM: NORMAL MMHG
BH CV XLRA - RV BASE: 2.7 CM
BH CV XLRA - RV LENGTH: 6.1 CM
BH CV XLRA - RV MID: 1.8 CM
BH CV XLRA - TDI S': 7.8 CM/SEC
BILIRUB SERPL-MCNC: 0.7 MG/DL (ref 0–1.2)
BUN SERPL-MCNC: 11 MG/DL (ref 8–23)
BUN/CREAT SERPL: 12.8 (ref 7–25)
C PNEUM DNA NPH QL NAA+NON-PROBE: NOT DETECTED
CALCIUM SPEC-SCNC: 9.4 MG/DL (ref 8.6–10.5)
CHLORIDE SERPL-SCNC: 101 MMOL/L (ref 98–107)
CO2 SERPL-SCNC: 27 MMOL/L (ref 22–29)
CREAT SERPL-MCNC: 0.86 MG/DL (ref 0.57–1)
D DIMER PPP FEU-MCNC: 2.81 MCGFEU/ML (ref 0–0.56)
DEPRECATED RDW RBC AUTO: 43 FL (ref 37–54)
EOSINOPHIL # BLD AUTO: 0.02 10*3/MM3 (ref 0–0.4)
EOSINOPHIL NFR BLD AUTO: 0.1 % (ref 0.3–6.2)
ERYTHROCYTE [DISTWIDTH] IN BLOOD BY AUTOMATED COUNT: 12.7 % (ref 12.3–15.4)
FLUAV H1 2009 PAND RNA NPH QL NAA+PROBE: NOT DETECTED
FLUAV H1 HA GENE NPH QL NAA+PROBE: NOT DETECTED
FLUAV H3 RNA NPH QL NAA+PROBE: NOT DETECTED
FLUAV SUBTYP SPEC NAA+PROBE: NOT DETECTED
FLUBV RNA ISLT QL NAA+PROBE: NOT DETECTED
GFR SERPL CREATININE-BSD FRML MDRD: 65 ML/MIN/1.73
GLOBULIN UR ELPH-MCNC: 4.2 GM/DL
GLUCOSE SERPL-MCNC: 118 MG/DL (ref 65–99)
HADV DNA SPEC NAA+PROBE: NOT DETECTED
HCOV 229E RNA SPEC QL NAA+PROBE: NOT DETECTED
HCOV HKU1 RNA SPEC QL NAA+PROBE: NOT DETECTED
HCOV NL63 RNA SPEC QL NAA+PROBE: NOT DETECTED
HCOV OC43 RNA SPEC QL NAA+PROBE: NOT DETECTED
HCT VFR BLD AUTO: 43.5 % (ref 34–46.6)
HGB BLD-MCNC: 13.9 G/DL (ref 12–15.9)
HMPV RNA NPH QL NAA+NON-PROBE: NOT DETECTED
HOLD SPECIMEN: NORMAL
HOLD SPECIMEN: NORMAL
HPIV1 RNA SPEC QL NAA+PROBE: NOT DETECTED
HPIV2 RNA SPEC QL NAA+PROBE: NOT DETECTED
HPIV3 RNA NPH QL NAA+PROBE: NOT DETECTED
HPIV4 P GENE NPH QL NAA+PROBE: NOT DETECTED
IMM GRANULOCYTES # BLD AUTO: 0.06 10*3/MM3 (ref 0–0.05)
IMM GRANULOCYTES NFR BLD AUTO: 0.4 % (ref 0–0.5)
LEFT ATRIUM VOLUME INDEX: 17.8 ML/M^2
LEFT ATRIUM VOLUME: 30 ML
LYMPHOCYTES # BLD AUTO: 1.95 10*3/MM3 (ref 0.7–3.1)
LYMPHOCYTES NFR BLD AUTO: 12.4 % (ref 19.6–45.3)
M PNEUMO IGG SER IA-ACNC: NOT DETECTED
MAXIMAL PREDICTED HEART RATE: 149 BPM
MCH RBC QN AUTO: 29.6 PG (ref 26.6–33)
MCHC RBC AUTO-ENTMCNC: 32 G/DL (ref 31.5–35.7)
MCV RBC AUTO: 92.8 FL (ref 79–97)
MONOCYTES # BLD AUTO: 1.21 10*3/MM3 (ref 0.1–0.9)
MONOCYTES NFR BLD AUTO: 7.7 % (ref 5–12)
NEUTROPHILS NFR BLD AUTO: 12.39 10*3/MM3 (ref 1.7–7)
NEUTROPHILS NFR BLD AUTO: 79.1 % (ref 42.7–76)
NRBC BLD AUTO-RTO: 0 /100 WBC (ref 0–0.2)
NT-PROBNP SERPL-MCNC: 4344 PG/ML (ref 0–900)
PLATELET # BLD AUTO: 475 10*3/MM3 (ref 140–450)
PMV BLD AUTO: 9.1 FL (ref 6–12)
POTASSIUM SERPL-SCNC: 4.2 MMOL/L (ref 3.5–5.2)
PROT SERPL-MCNC: 8 G/DL (ref 6–8.5)
RBC # BLD AUTO: 4.69 10*6/MM3 (ref 3.77–5.28)
RHINOVIRUS RNA SPEC NAA+PROBE: NOT DETECTED
RSV RNA NPH QL NAA+NON-PROBE: NOT DETECTED
SARS-COV-2 RNA NPH QL NAA+NON-PROBE: NOT DETECTED
SODIUM SERPL-SCNC: 138 MMOL/L (ref 136–145)
STRESS TARGET HR: 127 BPM
TROPONIN T SERPL-MCNC: <0.01 NG/ML (ref 0–0.03)
WBC # BLD AUTO: 15.68 10*3/MM3 (ref 3.4–10.8)
WHOLE BLOOD HOLD SPECIMEN: NORMAL
WHOLE BLOOD HOLD SPECIMEN: NORMAL

## 2020-08-25 PROCEDURE — 99284 EMERGENCY DEPT VISIT MOD MDM: CPT | Performed by: INTERNAL MEDICINE

## 2020-08-25 PROCEDURE — 99284 EMERGENCY DEPT VISIT MOD MDM: CPT

## 2020-08-25 PROCEDURE — 80053 COMPREHEN METABOLIC PANEL: CPT

## 2020-08-25 PROCEDURE — 71275 CT ANGIOGRAPHY CHEST: CPT

## 2020-08-25 PROCEDURE — 93005 ELECTROCARDIOGRAM TRACING: CPT

## 2020-08-25 PROCEDURE — 0 IOPAMIDOL PER 1 ML: Performed by: EMERGENCY MEDICINE

## 2020-08-25 PROCEDURE — 85379 FIBRIN DEGRADATION QUANT: CPT | Performed by: NURSE PRACTITIONER

## 2020-08-25 PROCEDURE — 83880 ASSAY OF NATRIURETIC PEPTIDE: CPT

## 2020-08-25 PROCEDURE — 85025 COMPLETE CBC W/AUTO DIFF WBC: CPT

## 2020-08-25 PROCEDURE — 84484 ASSAY OF TROPONIN QUANT: CPT

## 2020-08-25 PROCEDURE — 96361 HYDRATE IV INFUSION ADD-ON: CPT

## 2020-08-25 PROCEDURE — 25010000002 KETOROLAC TROMETHAMINE PER 15 MG: Performed by: NURSE PRACTITIONER

## 2020-08-25 PROCEDURE — 93306 TTE W/DOPPLER COMPLETE: CPT | Performed by: INTERNAL MEDICINE

## 2020-08-25 PROCEDURE — 0202U NFCT DS 22 TRGT SARS-COV-2: CPT | Performed by: NURSE PRACTITIONER

## 2020-08-25 PROCEDURE — 96374 THER/PROPH/DIAG INJ IV PUSH: CPT

## 2020-08-25 PROCEDURE — 93306 TTE W/DOPPLER COMPLETE: CPT

## 2020-08-25 RX ORDER — SODIUM CHLORIDE 0.9 % (FLUSH) 0.9 %
10 SYRINGE (ML) INJECTION AS NEEDED
Status: DISCONTINUED | OUTPATIENT
Start: 2020-08-25 | End: 2020-08-25 | Stop reason: HOSPADM

## 2020-08-25 RX ORDER — COLCHICINE 0.6 MG/1
0.6 TABLET ORAL DAILY
Qty: 14 TABLET | Refills: 0 | OUTPATIENT
Start: 2020-08-25 | End: 2020-12-06

## 2020-08-25 RX ORDER — DIPHENOXYLATE HYDROCHLORIDE AND ATROPINE SULFATE 2.5; .025 MG/1; MG/1
1 TABLET ORAL DAILY
Status: DISCONTINUED | OUTPATIENT
Start: 2020-08-26 | End: 2020-08-25 | Stop reason: HOSPADM

## 2020-08-25 RX ORDER — INDOMETHACIN 25 MG/1
25 CAPSULE ORAL DAILY
Qty: 14 CAPSULE | Refills: 0 | OUTPATIENT
Start: 2020-08-25 | End: 2020-12-06

## 2020-08-25 RX ORDER — VENLAFAXINE HYDROCHLORIDE 75 MG/1
75 CAPSULE, EXTENDED RELEASE ORAL DAILY
Status: DISCONTINUED | OUTPATIENT
Start: 2020-08-25 | End: 2020-08-25 | Stop reason: HOSPADM

## 2020-08-25 RX ORDER — KETOROLAC TROMETHAMINE 15 MG/ML
15 INJECTION, SOLUTION INTRAMUSCULAR; INTRAVENOUS ONCE
Status: COMPLETED | OUTPATIENT
Start: 2020-08-25 | End: 2020-08-25

## 2020-08-25 RX ORDER — CETIRIZINE HYDROCHLORIDE 10 MG/1
10 TABLET ORAL DAILY
Status: DISCONTINUED | OUTPATIENT
Start: 2020-08-25 | End: 2020-08-25 | Stop reason: HOSPADM

## 2020-08-25 RX ORDER — HYDROXYZINE HYDROCHLORIDE 25 MG/1
25 TABLET, FILM COATED ORAL 3 TIMES DAILY PRN
Status: DISCONTINUED | OUTPATIENT
Start: 2020-08-25 | End: 2020-08-25 | Stop reason: HOSPADM

## 2020-08-25 RX ORDER — COLCHICINE 0.6 MG/1
0.6 TABLET ORAL DAILY
Status: DISCONTINUED | OUTPATIENT
Start: 2020-08-25 | End: 2020-08-25 | Stop reason: HOSPADM

## 2020-08-25 RX ADMIN — CETIRIZINE HYDROCHLORIDE 10 MG: 10 TABLET, FILM COATED ORAL at 16:05

## 2020-08-25 RX ADMIN — SODIUM CHLORIDE 500 ML: 9 INJECTION, SOLUTION INTRAVENOUS at 13:14

## 2020-08-25 RX ADMIN — COLCHICINE 0.6 MG: 0.6 TABLET, FILM COATED ORAL at 16:01

## 2020-08-25 RX ADMIN — KETOROLAC TROMETHAMINE 15 MG: 15 INJECTION, SOLUTION INTRAMUSCULAR; INTRAVENOUS at 15:59

## 2020-08-25 RX ADMIN — IOPAMIDOL 60 ML: 755 INJECTION, SOLUTION INTRAVENOUS at 14:04

## 2020-08-25 RX ADMIN — SODIUM CHLORIDE 500 ML: 9 INJECTION, SOLUTION INTRAVENOUS at 14:52

## 2020-08-25 NOTE — DISCHARGE INSTRUCTIONS
Take colchicine once daily for 2 weeks and Indocin once daily for 2 weeks.  Maintain your best hydration and nutrition.  Continue your propanolol twice daily.  Follow-up with Dr. Iverson as directed.  Return to the emergency department as needed for worsening symptoms or concerns.  Thank you

## 2020-08-25 NOTE — CONSULTS
Cardiac Electrophysiology In Patient Consultation        [facility]     Consultation      PATIENT NAME:  Haven Koenig    :  1950 AGE: 69 y.o.     Date of Admission:  2020  Date of Consultation:  2020    Primary Electrophysiologist: Dr. Iverson    Primary Cardiologist:  Dr. Back     Subjective      REASON FOR CONSULT:  CP, SOA    CHIEF COMPLAINT: CP, SOA    Problem List:      1. Complete Heart Block   a. BSC DDD PM 2020 per Dr. Back at [facility]   b. Documented complication at implant with perforated RV lead requiring pericardial drain  2. Anxiety/depression  3. Allergies  4. History of rheumatic fever      HISTORY OF PRESENT ILLNESS: Ms. Haven Koenig is a very pleasant 69-year-old white female seen in EP evaluation today for chest pain and shortness of air.  She was recently seen in EP consultation on 2020 following a [employer] dual-chamber pacemaker implant at outlying facility for complete heart block with noted RV lead perforation resulting in subsequent pericardial drain placement.  Patient now presenting to the emergency department with complaints of ongoing fatigue and weakness since her pacemaker implant with exertional chest pressure with in perspiration that radiates from her substernal area into her back between her shoulder blades.  She reports that her pain only occurs when she deep breathes with exertion and is relieved with rest.  Upon examination patient is resting comfortably in bed satting 98% on room air.  She is in no acute distress.  Patient noted be in sinus tachycardia 100 to 110 bpm with SBP 90 mmHg.  During examination patient admits that she is not having any discomfort with breathing and it is only with any form of exertion such as walking to the house that she has pain.  Patient denies fever, palpitations, dizziness, presyncope, or syncope.  Patient's pacemaker incision site is well-healed and  pericardial drain site as well without redness, edema, or drainage noted.    PAST MEDICAL HISTORY  Past Medical History:   Diagnosis Date   • H/O active rheumatic fever    • Heart block    • Pacemaker    • Wears glasses        SURGICAL HISTORY   has a past surgical history that includes Back surgery (2015); Cataract extraction; Tonsillectomy; Appendectomy; and Pacemaker Implantation (2020).     SOCIAL HISTORY  Social History     Socioeconomic History   • Marital status:      Spouse name: Not on file   • Number of children: Not on file   • Years of education: Not on file   • Highest education level: Not on file   Tobacco Use   • Smoking status: Never Smoker   • Smokeless tobacco: Never Used   Substance and Sexual Activity   • Alcohol use: Never     Frequency: Never   • Drug use: Defer   • Sexual activity: Defer   Social History Narrative    Caffeine coffee 3 servings dt pepsi and dt coke and tea       FAMILY HISTORY  family history includes Arrhythmia in her brother and paternal grandfather; Cancer in her brother; No Known Problems in her father, maternal grandfather, and paternal grandmother; Stroke in her paternal grandfather.     MEDICATIONS  Prior to Admission medications    Medication Sig Start Date End Date Taking? Authorizing Provider   loratadine (Claritin) 10 MG tablet Take 10 mg by mouth Daily.   Yes Juanjo Carbajal MD   Multiple Vitamin (MULTI-VITAMIN DAILY PO) Take  by mouth.   Yes Juanjo Carbajal MD   propranolol (INDERAL) 20 MG tablet Take 20 mg by mouth 3 (Three) Times a Day.   Yes Juanjo Carbajal MD   venlafaxine XR (EFFEXOR-XR) 75 MG 24 hr capsule Take 75 mg by mouth Daily.   Yes Juanjo Carbajal MD   hydrOXYzine (ATARAX) 25 MG tablet Take 25 mg by mouth 3 (Three) Times a Day As Needed for Itching.    Juanjo Carbajal MD       ALLERGIES  No Known Allergies    REVIEW OF SYSTEMS  Constitutional: No fevers or chills, no recent weight gain or weight loss, + weakness,  "fatigue  Eyes: No visual loss, blurred vision, double vision, yellow sclerae.  ENT: No headaches, hearing loss, vertigo, congestion or sore throat.   Cardiovascular: Per HPI  Respiratory: No cough or wheezing, no sputum production, no hematemesis, + robbins   Gastrointestinal: No abdominal pain, no nausea, vomiting, constipation, diarrhea, melena.   Genitourinary: No dysuria, hematuria or increased frequency.  Musculoskeletal:  No gait disturbance, weakness or joint pain or stiffness  Integumentary: No rashes, urticaria, ulcers or sores.   Neurological: No headache, dizziness, syncope, paralysis, ataxia, no prior CVA/TIA  Psychiatric: No anxiety, or depression  Endocrine: No diaphoresis, cold or heat intolerance. No polyuria or polydipsia.   Hematologic/Lymphatic: No anemia, abnormal bruising or bleeding. No history of DVT/PE.      Objective     VITAL SIGNS: /65 (BP Location: Left arm, Patient Position: Sitting)   Pulse 96 Comment: pt denies dizziness, soa, nausea  Temp 98.6 °F (37 °C) (Oral)   Resp 18   Ht 165.1 cm (65\")   Wt 62.1 kg (137 lb)   SpO2 96%   BMI 22.80 kg/m²      ADMIT WEIGHT:  62.1 kg (137 lb)  BMI: Body mass index is 22.8 kg/m².    Admission Weight: 62.1 kg (137 lb)     PHYSICAL EXAM  General appearance: Awake, alert, cooperative  Head: Normocephalic, without obvious abnormality, atraumatic  Eyes: Conjunctivae/corneas clear, EOMs intact  Neck: no adenopathy, no carotid bruit, no JVD and thyroid: not enlarged  Lungs: clear to auscultation bilaterally and no rhonchi or crackles\", ' symmetric  Heart: regular rate and rhythm, S1, S2 normal, no murmur, click, rub or gallop  Abdomen: Soft, non-tender, bowel sounds normal,  no organomegaly  Extremities: extremities normal, atraumatic, no cyanosis or edema  Skin: Skin color, turgor normal, no rashes or lesions  Neurologic: Grossly normal     CBC:   Results from last 7 days   Lab Units 08/25/20  1226   WBC 10*3/mm3 15.68*   HEMOGLOBIN g/dL 13.9 "   HEMATOCRIT % 43.5   MCV fL 92.8   PLATELETS 10*3/mm3 475*         BMP:  Results from last 7 days   Lab Units 08/25/20  1226   POTASSIUM mmol/L 4.2   CHLORIDE mmol/L 101   CO2 mmol/L 27.0   BUN mg/dL 11   CREATININE mg/dL 0.86   GLUCOSE mg/dL 118*   CALCIUM mg/dL 9.4       CURRENT MEDICATIONS:    Current Facility-Administered Medications:   •  colchicine tablet 0.6 mg, 0.6 mg, Oral, Daily, Dominga Yan APRN  •  ketorolac (TORADOL) injection 15 mg, 15 mg, Intravenous, Once, Dominga Yan APRN  •  sodium chloride 0.9 % bolus 500 mL, 500 mL, Intravenous, Once, Dominga Yan APRN  •  sodium chloride 0.9 % flush 10 mL, 10 mL, Intravenous, PRN, Emergency, Triage Protocol, MD    Current Outpatient Medications:   •  loratadine (Claritin) 10 MG tablet, Take 10 mg by mouth Daily., Disp: , Rfl:   •  Multiple Vitamin (MULTI-VITAMIN DAILY PO), Take  by mouth., Disp: , Rfl:   •  propranolol (INDERAL) 20 MG tablet, Take 20 mg by mouth 3 (Three) Times a Day., Disp: , Rfl:   •  venlafaxine XR (EFFEXOR-XR) 75 MG 24 hr capsule, Take 75 mg by mouth Daily., Disp: , Rfl:   •  hydrOXYzine (ATARAX) 25 MG tablet, Take 25 mg by mouth 3 (Three) Times a Day As Needed for Itching., Disp: , Rfl:            TELEMETRY:  STach 105 bpm     Assessment & Plan     Ms. Haven Koenig is a 69-year-old white female seen in EP evaluation for inspirational chest pain and shortness of air associated with exertion.  Patient [facility] recently [facility] consultation [facility].  Patient was noted to have a RV lead perforation resulting in pericardial drain placement with her device placement.  Patient had CT scan performed concerning for pericarditis.  Stat echocardiogram has been ordered with initiation of colchicine and Toradol per Dr. Iverson.  Additional orders and admit status to follow after echocardiogram and assessment per Dr. Iverson.      Electronically  signed by KAYLA Sandoval, 08/25/20, 2:45 PM.    I have personally performed a face to face diagnostic evaluation on this patient.  I have reviewed and agree with the care plan.  History and Exam by me shows: This is a 69-year-old patient with a history of a pacemaker placement complication from an outside hospital.  She resents to the ER with symptoms consistent with pericarditis.    Vitals:    08/25/20 1321   BP:    Pulse: 96   Resp:    Temp:    SpO2:      General-Well Nourished, Well developed  Eyes - PERRLA  Neck- supple, No mass  CV- regular rate and rhythm, no MRG, No edema  Lung- clear bilaterally  Abd- soft, +BS  Musc/skel - Norm strength and range of motion  Skin- warm and dry  Neuro - Alert & Oriented x 3, appropriate mood.    A/P:  1.  Pericarditis from recent pacemaker perforation.  Echo shows findings consistent with a small pericardial effusion no evidence of cardiac tamponade or compromise.  CT scan showed findings consistent with acute pericarditis.  We will treat the patient's symptoms with Colchicine and NSAIDs for 2-3 weeks    Cardiac stable for discharge.    Gerard Iverson M.D., FJOSE MANUELC, F.H.R.S.  Cardiology/Electrophysiology  8/25/2020  16:27

## 2020-08-25 NOTE — ED PROVIDER NOTES
"Subjective   Patient presents to the emergency department with complaint of sharp recurrent chest pain accompanied by increase in difficulty breathing and light headedness over the last 2 days.  More specifically, Ms. Koenig had a pacemaker placed while at [facility] on July 14 and had the complication of subsequent perforation and pericardial drain was placed.  Patient states that she is continued to have discomfort with breathing since that day, though not consistently.  In fact, she regrets that she did not mention this recurrent discomfort to Dr. Iverson when she established care with him just last Thursday, August 21.  The trigger for her being seen today was ongoing nausea and lightheadedness for the last 3 days with some shortness of breath.    Patient denies any exposure to cope with positive citizens.  She denies any fever, chills, loss of taste or smell, or cough.  As aforementioned, she is positive for chest pain or shortness of breath.  She admits to nausea last night but no vomiting or diarrhea.    Patient states to her understanding that a pacemaker was the intervention for \"some kind of heart block\".  She takes propanolol twice daily rather than 3 times daily as prescribed.  She has had her usual dose this morning.      Shortness of Breath   Severity:  Moderate  Onset quality:  Gradual  Duration:  5 weeks  Progression:  Worsening (wosrening for 3 days )  Chronicity:  New  Context: activity    Relieved by:  Rest  Worsened by:  Nothing  Ineffective treatments:  None tried  Associated symptoms: chest pain (w deep breathing )    Associated symptoms: no abdominal pain, no cough, no fever, no sputum production, no swollen glands, no vomiting and no wheezing    Risk factors: recent surgery    Risk factors: no hx of PE/DVT and no obesity        Review of Systems   Constitutional: Negative for fever.   Respiratory: Positive for shortness of breath. Negative for cough, sputum production and wheezing.  "   Cardiovascular: Positive for chest pain (w deep breathing ). Negative for palpitations and leg swelling.   Gastrointestinal: Positive for nausea. Negative for abdominal pain and vomiting.   Endocrine: Negative.    Skin: Negative.    Allergic/Immunologic: Negative.    Neurological: Positive for light-headedness. Negative for syncope.   Hematological: Does not bruise/bleed easily (takes no anticoagulants).   Psychiatric/Behavioral: Negative.    All other systems reviewed and are negative.      Past Medical History:   Diagnosis Date   • H/O active rheumatic fever    • Heart block    • Pacemaker    • Wears glasses        No Known Allergies    Past Surgical History:   Procedure Laterality Date   • APPENDECTOMY     • BACK SURGERY  2015   • CATARACT EXTRACTION     • PACEMAKER IMPLANTATION  2020   • TONSILLECTOMY         Family History   Problem Relation Age of Onset   • No Known Problems Father    • Arrhythmia Brother    • Cancer Brother    • No Known Problems Maternal Grandfather    • No Known Problems Paternal Grandmother    • Arrhythmia Paternal Grandfather    • Stroke Paternal Grandfather        Social History     Socioeconomic History   • Marital status:      Spouse name: Not on file   • Number of children: Not on file   • Years of education: Not on file   • Highest education level: Not on file   Tobacco Use   • Smoking status: Never Smoker   • Smokeless tobacco: Never Used   Substance and Sexual Activity   • Alcohol use: Never     Frequency: Never   • Drug use: Defer   • Sexual activity: Defer   Social History Narrative    Caffeine coffee 3 servings dt pepsi and dt coke and tea           Objective   Physical Exam   Constitutional: She is oriented to person, place, and time. She appears well-developed and well-nourished.  Non-toxic appearance. She does not appear ill. No distress.   HENT:   Head: Normocephalic.   Mouth/Throat: Oropharynx is clear and moist.   Eyes: Pupils are equal, round, and reactive to  light. EOM are normal.   Neck: Normal range of motion. Neck supple. No JVD present.   Cardiovascular: Normal rate and regular rhythm.   Rate 90s to 100     Pulmonary/Chest: Effort normal and breath sounds normal. She has no decreased breath sounds. She has no wheezes. She has no rales.   Abdominal: Soft. Bowel sounds are normal. She exhibits no distension. There is no rebound and no guarding.   Musculoskeletal: Normal range of motion. She exhibits no edema.        Right lower leg: Normal. She exhibits no edema.        Left lower leg: Normal. She exhibits no edema.   Neurological: She is alert and oriented to person, place, and time.   Skin: Skin is warm and dry. Capillary refill takes less than 2 seconds. She is not diaphoretic.   Psychiatric: She has a normal mood and affect. Her behavior is normal.   Nursing note and vitals reviewed.      Procedures           ED Course  ED Course as of Aug 25 1727   Tue Aug 25, 2020   1302 WBC(!): 15.68 [MS]   1310 Cardiology consult called to [facility].     [MS]   1313 proBNP(!): 4,344.0 [MS]   1325 Dr. Iverson has conferred with me regarding the patient's presentation today; echo has been added to workup and Ms. Koenig understands and concurs.     [MS]   1417 D-Dimer, Quant(!): 2.81 [MS]   1431 Dr. Iverson has reviewed CTA which demonstrates pericarditis. Meds are ordered and echo is enroute now     [MS]   1442 Patient is alert and comfortable.  BP sys 80s, adding another 500ml bolus     [MS]   1626 Dr. Iverson is at the bedside and will DC to home with antiinflammatories.  Patient and her daughter understand and concur with outpatient plan of care with follow-up.    [MS]      ED Course User Index  [MS] Dominga Yan, KAYLA            Recent Results (from the past 24 hour(s))   Comprehensive Metabolic Panel    Collection Time: 08/25/20 12:26 PM   Result Value Ref Range    Glucose 118 (H) 65 - 99 mg/dL    BUN 11 8 - 23 mg/dL    Creatinine 0.86 0.57 - 1.00 mg/dL    Sodium 138  136 - 145 mmol/L    Potassium 4.2 3.5 - 5.2 mmol/L    Chloride 101 98 - 107 mmol/L    CO2 27.0 22.0 - 29.0 mmol/L    Calcium 9.4 8.6 - 10.5 mg/dL    Total Protein 8.0 6.0 - 8.5 g/dL    Albumin 3.80 3.50 - 5.20 g/dL    ALT (SGPT) 30 1 - 33 U/L    AST (SGOT) 27 1 - 32 U/L    Alkaline Phosphatase 117 39 - 117 U/L    Total Bilirubin 0.7 0.0 - 1.2 mg/dL    eGFR Non African Amer 65 >60 mL/min/1.73    Globulin 4.2 gm/dL    A/G Ratio 0.9 g/dL    BUN/Creatinine Ratio 12.8 7.0 - 25.0    Anion Gap 10.0 5.0 - 15.0 mmol/L   BNP    Collection Time: 08/25/20 12:26 PM   Result Value Ref Range    proBNP 4,344.0 (H) 0.0 - 900.0 pg/mL   Troponin    Collection Time: 08/25/20 12:26 PM   Result Value Ref Range    Troponin T <0.010 0.000 - 0.030 ng/mL   Light Blue Top    Collection Time: 08/25/20 12:26 PM   Result Value Ref Range    Extra Tube hold for add-on    Green Top (Gel)    Collection Time: 08/25/20 12:26 PM   Result Value Ref Range    Extra Tube Hold for add-ons.    Lavender Top    Collection Time: 08/25/20 12:26 PM   Result Value Ref Range    Extra Tube hold for add-on    Gold Top - SST    Collection Time: 08/25/20 12:26 PM   Result Value Ref Range    Extra Tube Hold for add-ons.    CBC Auto Differential    Collection Time: 08/25/20 12:26 PM   Result Value Ref Range    WBC 15.68 (H) 3.40 - 10.80 10*3/mm3    RBC 4.69 3.77 - 5.28 10*6/mm3    Hemoglobin 13.9 12.0 - 15.9 g/dL    Hematocrit 43.5 34.0 - 46.6 %    MCV 92.8 79.0 - 97.0 fL    MCH 29.6 26.6 - 33.0 pg    MCHC 32.0 31.5 - 35.7 g/dL    RDW 12.7 12.3 - 15.4 %    RDW-SD 43.0 37.0 - 54.0 fl    MPV 9.1 6.0 - 12.0 fL    Platelets 475 (H) 140 - 450 10*3/mm3    Neutrophil % 79.1 (H) 42.7 - 76.0 %    Lymphocyte % 12.4 (L) 19.6 - 45.3 %    Monocyte % 7.7 5.0 - 12.0 %    Eosinophil % 0.1 (L) 0.3 - 6.2 %    Basophil % 0.3 0.0 - 1.5 %    Immature Grans % 0.4 0.0 - 0.5 %    Neutrophils, Absolute 12.39 (H) 1.70 - 7.00 10*3/mm3    Lymphocytes, Absolute 1.95 0.70 - 3.10 10*3/mm3     Monocytes, Absolute 1.21 (H) 0.10 - 0.90 10*3/mm3    Eosinophils, Absolute 0.02 0.00 - 0.40 10*3/mm3    Basophils, Absolute 0.05 0.00 - 0.20 10*3/mm3    Immature Grans, Absolute 0.06 (H) 0.00 - 0.05 10*3/mm3    nRBC 0.0 0.0 - 0.2 /100 WBC   D-dimer, Quantitative    Collection Time: 08/25/20 12:26 PM   Result Value Ref Range    D-Dimer, Quantitative 2.81 (H) 0.00 - 0.56 MCGFEU/mL   Respiratory Panel PCR w/COVID-19(SARS-CoV-2) JACKIE/LYDIA/TAMIE/PAD In-House, NP Swab in [facility], 3-4 HR TAT - Swab, Nasopharynx    Collection Time: 08/25/20  1:20 PM   Result Value Ref Range    ADENOVIRUS, PCR Not Detected Not Detected    Coronavirus 229E Not Detected Not Detected    Coronavirus HKU1 Not Detected Not Detected    Coronavirus NL63 Not Detected Not Detected    Coronavirus OC43 Not Detected Not Detected    COVID19 Not Detected Not Detected - Ref. Range    Human Metapneumovirus Not Detected Not Detected    Human Rhinovirus/Enterovirus Not Detected Not Detected    Influenza A PCR Not Detected Not Detected    Influenza A H1 Not Detected Not Detected    Influenza A H1 2009 PCR Not Detected Not Detected    Influenza A H3 Not Detected Not Detected    Influenza B PCR Not Detected Not Detected    Parainfluenza Virus 1 Not Detected Not Detected    Parainfluenza Virus 2 Not Detected Not Detected    Parainfluenza Virus 3 Not Detected Not Detected    Parainfluenza Virus 4 Not Detected Not Detected    RSV, PCR Not Detected Not Detected    Bordetella pertussis pcr Not Detected Not Detected    Bordetella parapertussis PCR Not Detected Not Detected    Chlamydophila pneumoniae PCR Not Detected Not Detected    Mycoplasma pneumo by PCR Not Detected Not Detected   Adult Transthoracic Echo Complete W/ Cont if Necessary Per Protocol    Collection Time: 08/25/20  3:36 PM   Result Value Ref Range    BSA 1.7 m^2    IVSd 0.82 cm    LVIDd 3.6 cm    LVIDs 2.1 cm    LVPWd 0.86 cm    IVS/LVPW 0.96     FS 43.4 %    EDV(Teich) 55.6 ml    ESV(Teich) 13.7 ml     EF(Teich) 75.4 %    EDV(cubed) 47.9 ml    ESV(cubed) 8.7 ml    EF(cubed) 81.9 %    LV mass(C)d 85.4 grams    LV mass(C)dI 50.7 grams/m^2    SV(Teich) 42.0 ml    SI(Teich) 24.9 ml/m^2    SV(cubed) 39.2 ml    SI(cubed) 23.3 ml/m^2    Ao root diam 3.1 cm    Ao root area 7.4 cm^2    LA dimension 2.8 cm    LA/Ao 0.91     LVOT diam 2.0 cm    LVOT area 3.0 cm^2    LVOT area(traced) 3.1 cm^2    LAd major 3.9 cm    LVLd ap4 6.9 cm    EDV(MOD-sp4) 43.0 ml    LVLs ap4 5.6 cm    ESV(MOD-sp4) 20.0 ml    EF(MOD-sp4) 53.5 %    LVLs ap2 6.5 cm    ESV(MOD-sp2) 36.0 ml    LA volume 30.0 ml    SV(MOD-sp4) 23.0 ml    SI(MOD-sp4) 13.7 ml/m^2    Ao root area (BSA corrected) 1.8     LV Breaux Vol (BSA corrected) 25.5 ml/m^2    LV Sys Vol (BSA corrected) 11.9 ml/m^2    LA Volume Index 17.8 ml/m^2    MV E max flaco 97.2 cm/sec    MV dec time 0.17 sec    Ao pk flaco 104.7 cm/sec    Ao max PG 4.4 mmHg    Ao max PG (full) 1.3 mmHg    MAGI(V,A) 2.5 cm^2    MAGI(V,D) 2.5 cm^2    LV V1 max PG 3.1 mmHg    LV V1 mean PG 1.8 mmHg    LV V1 max 87.9 cm/sec    LV V1 mean 64.1 cm/sec    LV V1 VTI 12.6 cm    SV(LVOT) 38.3 ml    SI(LVOT) 22.8 ml/m^2    PA V2 max 87.4 cm/sec    PA max PG 3.1 mmHg    PA acc slope 611.7 cm/sec^2    PA acc time 0.11 sec    TR max flaco 221.2 cm/sec    TR max PG 20.0 mmHg    PA pr(Accel) 30.7 mmHg    Lat E/e'  4.2     Med E/e' 6.2     Lat Peak E' Flaco 22.7 cm/sec    Med Peak E' Flaco 15.6 cm/sec     CV ECHO ED - BZI_BMI 22.8 kilograms/m^2     CV ECHO ED - BSA(HAYCOCK) 1.7 m^2     CV ECHO ED - BZI_METRIC_WEIGHT 62.1 kg     CV ECHO ED - BZI_METRIC_HEIGHT 165.1 cm    Avg E/e' ratio 5.08     Target HR (85%) 127 bpm    Max. Pred. HR (100%) 149 bpm     CV VAS BP LEFT ARM 87/62 mmHg    RV S' 7.80 cm/sec    RV Base 2.70 cm    RV Length 6.10 cm    RV Mid 1.80 cm    RAP systole 3 mmHg    RVSP(TR) 23 mmHg    TAPSE (>1.6) 1.30 cm2     Note: In addition to lab results from this visit, the labs listed above may include labs taken at  "another facility or during a different encounter within the last 24 hours. Please correlate lab times with ED admission and discharge times for further clarification of the services performed during this visit.    CT Angiogram Chest   Final Result   1.  No PE.       2.  Interval development from 07/24/2020 fairly recent CT comparison of   small-to-moderate pericardial effusion with persistent cardiomegaly as   well as interval development of small left pleural effusion.       D:  08/25/2020   E:  08/25/2020       This report was finalized on 8/25/2020 5:20 PM by Dr. Quinten Potts.            Vitals:    08/25/20 1300 08/25/20 1321 08/25/20 1536 08/25/20 1630   BP: 95/56   90/55   BP Location:       Patient Position:       Pulse:  96  98   Resp:       Temp:       TempSrc:       SpO2: 94%   96%   Weight:   62.1 kg (137 lb)    Height:   165.1 cm (65\")      Medications   sodium chloride 0.9 % flush 10 mL (has no administration in time range)   colchicine tablet 0.6 mg (0.6 mg Oral Given 8/25/20 1601)   hydrOXYzine (ATARAX) tablet 25 mg (has no administration in time range)   cetirizine (zyrTEC) tablet 10 mg (10 mg Oral Given 8/25/20 1605)   multivitamin (THERAGRAN) tablet 1 tablet (has no administration in time range)   venlafaxine XR (EFFEXOR-XR) 24 hr capsule 75 mg (75 mg Oral Not Given 8/25/20 1642)   sodium chloride 0.9 % bolus 500 mL (0 mL Intravenous Stopped 8/25/20 1344)   iopamidol (ISOVUE-370) 76 % injection 100 mL (60 mL Intravenous Given 8/25/20 1404)   ketorolac (TORADOL) injection 15 mg (15 mg Intravenous Given 8/25/20 1559)   sodium chloride 0.9 % bolus 500 mL (500 mL Intravenous New Bag 8/25/20 1452)     ECG/EMG Results (last 24 hours)     Procedure Component Value Units Date/Time    ECG 12 Lead [[id number]] Collected:  08/25/20 1218     Updated:  08/25/20 1315        ECG 12 Lead           DISCHARGE    Patient discharged in stable condition.    Reviewed implications of results, diagnosis, meds, " responsibility to follow up, warning signs and symptoms of possible worsening, potential complications and reasons to return to ER.    Patient/Family voiced understanding of above instructions.    Discussed plan for discharge, as there is no emergent indication for admission.  Pt/family is agreeable and understands need for follow up and possible repeat testing.  Pt/family is aware that discharge does not mean that nothing is wrong but that it indicates no emergency is currently present that requires admission and they must continue care with follow-up as given below or with a physician of their choice.     FOLLOW-UP  Gerard Iverson MD  [address] [place] [address]  [phone]               Medication List      New Prescriptions    colchicine 0.6 MG tablet  Take 1 tablet by mouth Daily.     indomethacin 25 MG capsule  Commonly known as:  INDOCIN  Take 1 capsule by mouth Daily.                                              MDM    Final diagnoses:   Pericarditis, unspecified chronicity, unspecified type            Dominga Yan, KAYLA  08/25/20 9042

## 2020-08-26 ENCOUNTER — TELEPHONE (OUTPATIENT)
Dept: CARDIOLOGY | Facility: CLINIC | Age: 70
End: 2020-08-26

## 2020-08-26 NOTE — TELEPHONE ENCOUNTER
Pt called checking to see if [employer] home monitor had been ordered.  No monitor ordered.  Ordered monitor and cell adapter and notified pt.

## 2020-09-14 ENCOUNTER — TELEPHONE (OUTPATIENT)
Dept: CARDIOLOGY | Facility: CLINIC | Age: 70
End: 2020-09-14

## 2020-09-14 NOTE — TELEPHONE ENCOUNTER
Pt called c/o about her pacemaker shocking her last night while in bed. I explained to her that her device cannot shock and she stated it felt very intense and could not go to sleep until it stopped. I told her to call us if it happened again.She verbalized understanding.

## 2020-09-15 ENCOUNTER — TELEPHONE (OUTPATIENT)
Dept: CARDIOLOGY | Facility: CLINIC | Age: 70
End: 2020-09-15

## 2020-09-15 NOTE — TELEPHONE ENCOUNTER
"Patient's daughter called and left a message stating she thought her Mom should be seen because she has been seen in the ER a few times for the \"fluid build up\" I called the patient and the last time she was seen in the ER was 8/25. Her echo showed a trivial pericardial effusion and she was given colchicine and indomethacin. I asked the patient how she has been feeling since and she reports better and that she is just still continue to try and build her strength back up since she had so many issues with the PPM placement. I instructed her to call back if she had an problems or troubling symptoms and she agrees.  "

## 2020-12-06 ENCOUNTER — APPOINTMENT (OUTPATIENT)
Dept: GENERAL RADIOLOGY | Facility: HOSPITAL | Age: 70
End: 2020-12-06

## 2020-12-06 ENCOUNTER — HOSPITAL ENCOUNTER (EMERGENCY)
Facility: HOSPITAL | Age: 70
Discharge: HOME | End: 2020-12-06
Attending: EMERGENCY MEDICINE | Admitting: EMERGENCY MEDICINE

## 2020-12-06 VITALS
SYSTOLIC BLOOD PRESSURE: 113 MMHG | BODY MASS INDEX: 23.32 KG/M2 | RESPIRATION RATE: 16 BRPM | TEMPERATURE: 97.7 F | HEART RATE: 84 BPM | WEIGHT: 140 LBS | HEIGHT: 65 IN | DIASTOLIC BLOOD PRESSURE: 82 MMHG | OXYGEN SATURATION: 95 %

## 2020-12-06 DIAGNOSIS — U07.1 COVID-19 VIRUS INFECTION: Primary | ICD-10-CM

## 2020-12-06 DIAGNOSIS — Z95.0 PACEMAKER: ICD-10-CM

## 2020-12-06 LAB
ALBUMIN SERPL-MCNC: 4.3 G/DL (ref 3.5–5.2)
ALBUMIN/GLOB SERPL: 1 G/DL
ALP SERPL-CCNC: 81 U/L (ref 39–117)
ALT SERPL W P-5'-P-CCNC: 28 U/L (ref 1–33)
ANION GAP SERPL CALCULATED.3IONS-SCNC: 12 MMOL/L (ref 5–15)
AST SERPL-CCNC: 24 U/L (ref 1–32)
BASOPHILS # BLD AUTO: 0.01 10*3/MM3 (ref 0–0.2)
BASOPHILS NFR BLD AUTO: 0.2 % (ref 0–1.5)
BILIRUB SERPL-MCNC: 0.3 MG/DL (ref 0–1.2)
BUN SERPL-MCNC: 11 MG/DL (ref 8–23)
BUN/CREAT SERPL: 16.2 (ref 7–25)
CALCIUM SPEC-SCNC: 9.9 MG/DL (ref 8.6–10.5)
CHLORIDE SERPL-SCNC: 102 MMOL/L (ref 98–107)
CO2 SERPL-SCNC: 27 MMOL/L (ref 22–29)
CREAT SERPL-MCNC: 0.68 MG/DL (ref 0.57–1)
DEPRECATED RDW RBC AUTO: 47 FL (ref 37–54)
EOSINOPHIL # BLD AUTO: 0.11 10*3/MM3 (ref 0–0.4)
EOSINOPHIL NFR BLD AUTO: 1.8 % (ref 0.3–6.2)
ERYTHROCYTE [DISTWIDTH] IN BLOOD BY AUTOMATED COUNT: 13.9 % (ref 12.3–15.4)
GFR SERPL CREATININE-BSD FRML MDRD: 85 ML/MIN/1.73
GLOBULIN UR ELPH-MCNC: 4.1 GM/DL
GLUCOSE SERPL-MCNC: 104 MG/DL (ref 65–99)
HCT VFR BLD AUTO: 48.1 % (ref 34–46.6)
HGB BLD-MCNC: 15.3 G/DL (ref 12–15.9)
HOLD SPECIMEN: NORMAL
HOLD SPECIMEN: NORMAL
IMM GRANULOCYTES # BLD AUTO: 0.01 10*3/MM3 (ref 0–0.05)
IMM GRANULOCYTES NFR BLD AUTO: 0.2 % (ref 0–0.5)
LYMPHOCYTES # BLD AUTO: 2.05 10*3/MM3 (ref 0.7–3.1)
LYMPHOCYTES NFR BLD AUTO: 34.1 % (ref 19.6–45.3)
MAGNESIUM SERPL-MCNC: 2.1 MG/DL (ref 1.6–2.4)
MCH RBC QN AUTO: 29.1 PG (ref 26.6–33)
MCHC RBC AUTO-ENTMCNC: 31.8 G/DL (ref 31.5–35.7)
MCV RBC AUTO: 91.6 FL (ref 79–97)
MONOCYTES # BLD AUTO: 0.5 10*3/MM3 (ref 0.1–0.9)
MONOCYTES NFR BLD AUTO: 8.3 % (ref 5–12)
NEUTROPHILS NFR BLD AUTO: 3.33 10*3/MM3 (ref 1.7–7)
NEUTROPHILS NFR BLD AUTO: 55.4 % (ref 42.7–76)
NRBC BLD AUTO-RTO: 0 /100 WBC (ref 0–0.2)
PLATELET # BLD AUTO: 253 10*3/MM3 (ref 140–450)
PMV BLD AUTO: 9.2 FL (ref 6–12)
POTASSIUM SERPL-SCNC: 4 MMOL/L (ref 3.5–5.2)
PROT SERPL-MCNC: 8.4 G/DL (ref 6–8.5)
QT INTERVAL: 398 MS
QTC INTERVAL: 494 MS
RBC # BLD AUTO: 5.25 10*6/MM3 (ref 3.77–5.28)
SODIUM SERPL-SCNC: 141 MMOL/L (ref 136–145)
TROPONIN T SERPL-MCNC: <0.01 NG/ML (ref 0–0.03)
WBC # BLD AUTO: 6.01 10*3/MM3 (ref 3.4–10.8)
WHOLE BLOOD HOLD SPECIMEN: NORMAL
WHOLE BLOOD HOLD SPECIMEN: NORMAL

## 2020-12-06 PROCEDURE — 80053 COMPREHEN METABOLIC PANEL: CPT | Performed by: EMERGENCY MEDICINE

## 2020-12-06 PROCEDURE — 85025 COMPLETE CBC W/AUTO DIFF WBC: CPT

## 2020-12-06 PROCEDURE — 93005 ELECTROCARDIOGRAM TRACING: CPT | Performed by: EMERGENCY MEDICINE

## 2020-12-06 PROCEDURE — 93005 ELECTROCARDIOGRAM TRACING: CPT

## 2020-12-06 PROCEDURE — 84484 ASSAY OF TROPONIN QUANT: CPT | Performed by: EMERGENCY MEDICINE

## 2020-12-06 PROCEDURE — 71045 X-RAY EXAM CHEST 1 VIEW: CPT

## 2020-12-06 PROCEDURE — 83735 ASSAY OF MAGNESIUM: CPT | Performed by: EMERGENCY MEDICINE

## 2020-12-06 PROCEDURE — 99284 EMERGENCY DEPT VISIT MOD MDM: CPT

## 2020-12-06 RX ORDER — LANOLIN ALCOHOL/MO/W.PET/CERES
1000 CREAM (GRAM) TOPICAL DAILY
COMMUNITY
End: 2022-04-26

## 2020-12-06 RX ORDER — SODIUM CHLORIDE 0.9 % (FLUSH) 0.9 %
10 SYRINGE (ML) INJECTION AS NEEDED
Status: DISCONTINUED | OUTPATIENT
Start: 2020-12-06 | End: 2020-12-06 | Stop reason: HOSPADM

## 2020-12-07 ENCOUNTER — HOSPITAL ENCOUNTER (EMERGENCY)
Facility: HOSPITAL | Age: 70
Discharge: HOME | End: 2020-12-07
Attending: EMERGENCY MEDICINE | Admitting: EMERGENCY MEDICINE

## 2020-12-07 ENCOUNTER — APPOINTMENT (OUTPATIENT)
Dept: GENERAL RADIOLOGY | Facility: HOSPITAL | Age: 70
End: 2020-12-07

## 2020-12-07 ENCOUNTER — APPOINTMENT (OUTPATIENT)
Dept: CT IMAGING | Facility: HOSPITAL | Age: 70
End: 2020-12-07

## 2020-12-07 VITALS
RESPIRATION RATE: 20 BRPM | BODY MASS INDEX: 23.32 KG/M2 | DIASTOLIC BLOOD PRESSURE: 68 MMHG | WEIGHT: 140 LBS | HEART RATE: 104 BPM | TEMPERATURE: 98 F | SYSTOLIC BLOOD PRESSURE: 119 MMHG | HEIGHT: 65 IN | OXYGEN SATURATION: 97 %

## 2020-12-07 DIAGNOSIS — R06.02 SOB (SHORTNESS OF BREATH): ICD-10-CM

## 2020-12-07 DIAGNOSIS — J12.82 PNEUMONIA DUE TO COVID-19 VIRUS: Primary | ICD-10-CM

## 2020-12-07 DIAGNOSIS — U07.1 PNEUMONIA DUE TO COVID-19 VIRUS: Primary | ICD-10-CM

## 2020-12-07 LAB
ALBUMIN SERPL-MCNC: 4 G/DL (ref 3.5–5.2)
ALBUMIN/GLOB SERPL: 1 G/DL
ALP SERPL-CCNC: 80 U/L (ref 39–117)
ALT SERPL W P-5'-P-CCNC: 27 U/L (ref 1–33)
ANION GAP SERPL CALCULATED.3IONS-SCNC: 12 MMOL/L (ref 5–15)
AST SERPL-CCNC: 26 U/L (ref 1–32)
BASOPHILS # BLD AUTO: 0.02 10*3/MM3 (ref 0–0.2)
BASOPHILS NFR BLD AUTO: 0.3 % (ref 0–1.5)
BILIRUB SERPL-MCNC: 0.2 MG/DL (ref 0–1.2)
BUN SERPL-MCNC: 13 MG/DL (ref 8–23)
BUN/CREAT SERPL: 17.3 (ref 7–25)
CALCIUM SPEC-SCNC: 9.8 MG/DL (ref 8.6–10.5)
CHLORIDE SERPL-SCNC: 103 MMOL/L (ref 98–107)
CO2 SERPL-SCNC: 25 MMOL/L (ref 22–29)
CREAT SERPL-MCNC: 0.75 MG/DL (ref 0.57–1)
DEPRECATED RDW RBC AUTO: 46.6 FL (ref 37–54)
EOSINOPHIL # BLD AUTO: 0.07 10*3/MM3 (ref 0–0.4)
EOSINOPHIL NFR BLD AUTO: 1 % (ref 0.3–6.2)
ERYTHROCYTE [DISTWIDTH] IN BLOOD BY AUTOMATED COUNT: 13.6 % (ref 12.3–15.4)
GFR SERPL CREATININE-BSD FRML MDRD: 76 ML/MIN/1.73
GLOBULIN UR ELPH-MCNC: 4 GM/DL
GLUCOSE SERPL-MCNC: 95 MG/DL (ref 65–99)
HCT VFR BLD AUTO: 46.8 % (ref 34–46.6)
HGB BLD-MCNC: 14.8 G/DL (ref 12–15.9)
HOLD SPECIMEN: NORMAL
HOLD SPECIMEN: NORMAL
IMM GRANULOCYTES # BLD AUTO: 0.02 10*3/MM3 (ref 0–0.05)
IMM GRANULOCYTES NFR BLD AUTO: 0.3 % (ref 0–0.5)
LYMPHOCYTES # BLD AUTO: 1.93 10*3/MM3 (ref 0.7–3.1)
LYMPHOCYTES NFR BLD AUTO: 26.8 % (ref 19.6–45.3)
MCH RBC QN AUTO: 29 PG (ref 26.6–33)
MCHC RBC AUTO-ENTMCNC: 31.6 G/DL (ref 31.5–35.7)
MCV RBC AUTO: 91.6 FL (ref 79–97)
MONOCYTES # BLD AUTO: 0.68 10*3/MM3 (ref 0.1–0.9)
MONOCYTES NFR BLD AUTO: 9.4 % (ref 5–12)
NEUTROPHILS NFR BLD AUTO: 4.48 10*3/MM3 (ref 1.7–7)
NEUTROPHILS NFR BLD AUTO: 62.2 % (ref 42.7–76)
NRBC BLD AUTO-RTO: 0 /100 WBC (ref 0–0.2)
NT-PROBNP SERPL-MCNC: 372.4 PG/ML (ref 0–900)
PLATELET # BLD AUTO: 267 10*3/MM3 (ref 140–450)
PMV BLD AUTO: 9.2 FL (ref 6–12)
POTASSIUM SERPL-SCNC: 3.6 MMOL/L (ref 3.5–5.2)
PROT SERPL-MCNC: 8 G/DL (ref 6–8.5)
QT INTERVAL: 374 MS
QTC INTERVAL: 415 MS
RBC # BLD AUTO: 5.11 10*6/MM3 (ref 3.77–5.28)
SODIUM SERPL-SCNC: 140 MMOL/L (ref 136–145)
TROPONIN T SERPL-MCNC: <0.01 NG/ML (ref 0–0.03)
WBC # BLD AUTO: 7.2 10*3/MM3 (ref 3.4–10.8)
WHOLE BLOOD HOLD SPECIMEN: NORMAL
WHOLE BLOOD HOLD SPECIMEN: NORMAL

## 2020-12-07 PROCEDURE — 0 IOPAMIDOL PER 1 ML: Performed by: EMERGENCY MEDICINE

## 2020-12-07 PROCEDURE — 85025 COMPLETE CBC W/AUTO DIFF WBC: CPT

## 2020-12-07 PROCEDURE — 93005 ELECTROCARDIOGRAM TRACING: CPT

## 2020-12-07 PROCEDURE — 93005 ELECTROCARDIOGRAM TRACING: CPT | Performed by: EMERGENCY MEDICINE

## 2020-12-07 PROCEDURE — 71045 X-RAY EXAM CHEST 1 VIEW: CPT

## 2020-12-07 PROCEDURE — 99284 EMERGENCY DEPT VISIT MOD MDM: CPT

## 2020-12-07 PROCEDURE — 80053 COMPREHEN METABOLIC PANEL: CPT

## 2020-12-07 PROCEDURE — 71275 CT ANGIOGRAPHY CHEST: CPT

## 2020-12-07 PROCEDURE — 84484 ASSAY OF TROPONIN QUANT: CPT

## 2020-12-07 PROCEDURE — 83880 ASSAY OF NATRIURETIC PEPTIDE: CPT

## 2020-12-07 RX ORDER — DEXAMETHASONE 4 MG/1
6 TABLET ORAL DAILY
Qty: 6 TABLET | Refills: 0 | Status: SHIPPED | OUTPATIENT
Start: 2020-12-07 | End: 2020-12-07 | Stop reason: SDUPTHER

## 2020-12-07 RX ORDER — DEXAMETHASONE 4 MG/1
6 TABLET ORAL 3 TIMES DAILY
Qty: 6 TABLET | Refills: 0 | Status: SHIPPED | OUTPATIENT
Start: 2020-12-07 | End: 2020-12-07 | Stop reason: SDUPTHER

## 2020-12-07 RX ORDER — DEXAMETHASONE 4 MG/1
6 TABLET ORAL DAILY
Qty: 6 TABLET | Refills: 0 | Status: SHIPPED | OUTPATIENT
Start: 2020-12-07 | End: 2021-03-16

## 2020-12-07 RX ORDER — SODIUM CHLORIDE 0.9 % (FLUSH) 0.9 %
10 SYRINGE (ML) INJECTION AS NEEDED
Status: DISCONTINUED | OUTPATIENT
Start: 2020-12-07 | End: 2020-12-07 | Stop reason: HOSPADM

## 2020-12-07 RX ADMIN — IOPAMIDOL 75 ML: 755 INJECTION, SOLUTION INTRAVENOUS at 19:15

## 2020-12-07 NOTE — ED PROVIDER NOTES
Subjective   Ms. Koenig is a 70-year-old female that was diagnosed with Covid pneumonia about 7 days ago.  Patient reports she has had a little bit of a shortness of breath but states the main reason she is here is she had a pacemaker placed about 6 months ago.  She did get an fluid built up on her lungs afterwards and was concerned made that it happened again.  She had no pedal edema she has had no PND no orthopnea.  She has had no fevers no chills and states that as far as the Covid infection go she feels like she is very good.  Sri is concerned that maybe she had buildup of fluid on her lungs again as reason she brought to the emergency department.      History provided by:  Patient   used: No    Shortness of Breath  Severity:  Mild  Onset quality:  Gradual  Timing:  Intermittent  Progression:  Waxing and waning  Chronicity:  New  Context: not activity    Context comment:  Recent diagnosis of COVID-19.  This is a 7.  Relieved by:  Nothing  Worsened by:  Nothing  Ineffective treatments:  None tried  Associated symptoms: cough    Associated symptoms: no abdominal pain, no chest pain, no claudication, no diaphoresis, no ear pain, no headaches, no hemoptysis, no neck pain, no PND, no sputum production, no syncope, no swollen glands, no vomiting and no wheezing    Risk factors: no recent alcohol use, no family hx of DVT, no hx of PE/DVT, no obesity, no prolonged immobilization and no recent surgery        Review of Systems   Constitutional: Negative for diaphoresis.   HENT: Negative for ear pain.    Respiratory: Positive for cough and shortness of breath. Negative for hemoptysis, sputum production and wheezing.    Cardiovascular: Negative for chest pain, palpitations, claudication, leg swelling, syncope and PND.   Gastrointestinal: Negative for abdominal pain and vomiting.   Musculoskeletal: Negative for back pain, myalgias and neck pain.   Neurological: Negative for headaches.    Psychiatric/Behavioral: Negative.    All other systems reviewed and are negative.      Past Medical History:   Diagnosis Date   • H/O active rheumatic fever    • Heart block    • Pacemaker    • Wears glasses        No Known Allergies    Past Surgical History:   Procedure Laterality Date   • APPENDECTOMY     • BACK SURGERY  2015   • CATARACT EXTRACTION     • PACEMAKER IMPLANTATION  2020   • TONSILLECTOMY         Family History   Problem Relation Age of Onset   • No Known Problems Father    • Arrhythmia Brother    • Cancer Brother    • No Known Problems Maternal Grandfather    • No Known Problems Paternal Grandmother    • Arrhythmia Paternal Grandfather    • Stroke Paternal Grandfather        Social History     Socioeconomic History   • Marital status:      Spouse name: Not on file   • Number of children: Not on file   • Years of education: Not on file   • Highest education level: Not on file   Tobacco Use   • Smoking status: Never Smoker   • Smokeless tobacco: Never Used   Substance and Sexual Activity   • Alcohol use: Never     Frequency: Never   • Drug use: Defer   • Sexual activity: Defer   Social History Narrative    Caffeine coffee 3 servings dt pepsi and dt coke and tea           Objective   Physical Exam  Vitals signs and nursing note reviewed.   Constitutional:       General: She is not in acute distress.     Appearance: She is well-developed. She is not diaphoretic.      Comments: Warm pink dry afebrile nontoxic resting comfortably   HENT:      Head: Normocephalic and atraumatic.      Nose: Nose normal.   Eyes:      General: No scleral icterus.     Conjunctiva/sclera: Conjunctivae normal.   Neck:      Musculoskeletal: Normal range of motion and neck supple.   Cardiovascular:      Rate and Rhythm: Normal rate and regular rhythm.      Heart sounds: Normal heart sounds. No murmur.      Comments: No pedal edema.  Pulmonary:      Effort: Pulmonary effort is normal. No respiratory distress.      Breath  sounds: Normal breath sounds.   Abdominal:      General: Bowel sounds are normal.      Palpations: Abdomen is soft.      Tenderness: There is no abdominal tenderness.   Musculoskeletal: Normal range of motion.   Skin:     General: Skin is warm and dry.   Neurological:      Mental Status: She is alert and oriented to person, place, and time.   Psychiatric:         Behavior: Behavior normal.         Procedures           ED Course                                   Discussed the findings with Ms. Koenig.  She is still resting comfortably O2 sats 95 to 96% without oxygen.  Should be discharged home.  Recent Results (from the past 24 hour(s))   ECG 12 Lead    Collection Time: 12/07/20  4:24 PM   Result Value Ref Range    QT Interval 374 ms    QTC Interval 415 ms   Comprehensive Metabolic Panel    Collection Time: 12/07/20  4:29 PM    Specimen: Blood   Result Value Ref Range    Glucose 95 65 - 99 mg/dL    BUN 13 8 - 23 mg/dL    Creatinine 0.75 0.57 - 1.00 mg/dL    Sodium 140 136 - 145 mmol/L    Potassium 3.6 3.5 - 5.2 mmol/L    Chloride 103 98 - 107 mmol/L    CO2 25.0 22.0 - 29.0 mmol/L    Calcium 9.8 8.6 - 10.5 mg/dL    Total Protein 8.0 6.0 - 8.5 g/dL    Albumin 4.00 3.50 - 5.20 g/dL    ALT (SGPT) 27 1 - 33 U/L    AST (SGOT) 26 1 - 32 U/L    Alkaline Phosphatase 80 39 - 117 U/L    Total Bilirubin 0.2 0.0 - 1.2 mg/dL    eGFR Non African Amer 76 >60 mL/min/1.73    Globulin 4.0 gm/dL    A/G Ratio 1.0 g/dL    BUN/Creatinine Ratio 17.3 7.0 - 25.0    Anion Gap 12.0 5.0 - 15.0 mmol/L   BNP    Collection Time: 12/07/20  4:29 PM    Specimen: Blood   Result Value Ref Range    proBNP 372.4 0.0 - 900.0 pg/mL   Troponin    Collection Time: 12/07/20  4:29 PM    Specimen: Blood   Result Value Ref Range    Troponin T <0.010 0.000 - 0.030 ng/mL   Light Blue Top    Collection Time: 12/07/20  4:29 PM   Result Value Ref Range    Extra Tube hold for add-on    Green Top (Gel)    Collection Time: 12/07/20  4:29 PM   Result Value Ref Range     Extra Tube Hold for add-ons.    Lavender Top    Collection Time: 12/07/20  4:29 PM   Result Value Ref Range    Extra Tube hold for add-on    Gold Top - SST    Collection Time: 12/07/20  4:29 PM   Result Value Ref Range    Extra Tube Hold for add-ons.    CBC Auto Differential    Collection Time: 12/07/20  4:29 PM    Specimen: Blood   Result Value Ref Range    WBC 7.20 3.40 - 10.80 10*3/mm3    RBC 5.11 3.77 - 5.28 10*6/mm3    Hemoglobin 14.8 12.0 - 15.9 g/dL    Hematocrit 46.8 (H) 34.0 - 46.6 %    MCV 91.6 79.0 - 97.0 fL    MCH 29.0 26.6 - 33.0 pg    MCHC 31.6 31.5 - 35.7 g/dL    RDW 13.6 12.3 - 15.4 %    RDW-SD 46.6 37.0 - 54.0 fl    MPV 9.2 6.0 - 12.0 fL    Platelets 267 140 - 450 10*3/mm3    Neutrophil % 62.2 42.7 - 76.0 %    Lymphocyte % 26.8 19.6 - 45.3 %    Monocyte % 9.4 5.0 - 12.0 %    Eosinophil % 1.0 0.3 - 6.2 %    Basophil % 0.3 0.0 - 1.5 %    Immature Grans % 0.3 0.0 - 0.5 %    Neutrophils, Absolute 4.48 1.70 - 7.00 10*3/mm3    Lymphocytes, Absolute 1.93 0.70 - 3.10 10*3/mm3    Monocytes, Absolute 0.68 0.10 - 0.90 10*3/mm3    Eosinophils, Absolute 0.07 0.00 - 0.40 10*3/mm3    Basophils, Absolute 0.02 0.00 - 0.20 10*3/mm3    Immature Grans, Absolute 0.02 0.00 - 0.05 10*3/mm3    nRBC 0.0 0.0 - 0.2 /100 WBC     Note: In addition to lab results from this visit, the labs listed above may include labs taken at another facility or during a different encounter within the last 24 hours. Please correlate lab times with ED admission and discharge times for further clarification of the services performed during this visit.    XR Chest 1 View   Final Result   No acute cardiopulmonary disease. Findings are stable.       D:  12/06/2020   E:  12/07/2020       This report was finalized on 12/7/2020 8:56 PM by Dr. Olga Varela MD.            Vitals:    12/06/20 1800 12/06/20 1830 12/06/20 1900 12/06/20 1935   BP:  134/80 134/84 113/82   BP Location:       Patient Position:       Pulse: 94 90 101 84   Resp:    16    Temp:    97.7 °F (36.5 °C)   TempSrc:    Oral   SpO2: 93% 95% 95% 95%   Weight:       Height:         Medications - No data to display  ECG/EMG Results (last 24 hours)     Procedure Component Value Units Date/Time    ECG 12 Lead [[id number]] Collected: 12/06/20 1633     Updated: 12/06/20 1635        ECG 12 Lead   Final Result   Test Reason : Weak/Dizzy/AMS protocol   Blood Pressure : **/** mmHG   Vent. Rate : 093 BPM     Atrial Rate : 093 BPM      P-R Int : 286 ms          QRS Dur : 146 ms       QT Int : 398 ms       P-R-T Axes : 046 051 227 degrees      QTc Int : 494 ms      Atrial-sensed ventricular-paced rhythm with prolonged AV conduction with   occasional sinus complexes and with occasional premature ventricular   complexes   Abnormal ECG   When compared with ECG of 25-AUG-2020 12:18,   premature ventricular complexes are now present   Vent. rate has decreased BY   3 BPM   Confirmed by IVAN ROLAND MD ([id number]) on 12/6/2020 9:46:15 PM      Referred By:  EDMD           Confirmed By:IVAN ROLAND MD                  MDM  Number of Diagnoses or Management Options  COVID-19 virus infection: new and requires workup  Pacemaker: new and requires workup     Amount and/or Complexity of Data Reviewed  Clinical lab tests: reviewed and ordered  Tests in the radiology section of CPT®: reviewed and ordered  Tests in the medicine section of CPT®: reviewed and ordered  Discuss the patient with other providers: yes    Patient Progress  Patient progress: stable      Final diagnoses:   COVID-19 virus infection   Pacemaker            Donal Triplett PA  12/07/20 1368

## 2020-12-08 ENCOUNTER — HOSPITAL ENCOUNTER (OUTPATIENT)
Facility: HOSPITAL | Age: 70
Discharge: HOME | End: 2020-12-08
Attending: INTERNAL MEDICINE | Admitting: INTERNAL MEDICINE

## 2020-12-08 VITALS
RESPIRATION RATE: 16 BRPM | DIASTOLIC BLOOD PRESSURE: 63 MMHG | BODY MASS INDEX: 23.32 KG/M2 | HEART RATE: 82 BPM | TEMPERATURE: 97.8 F | HEIGHT: 65 IN | SYSTOLIC BLOOD PRESSURE: 134 MMHG | WEIGHT: 140 LBS | OXYGEN SATURATION: 96 %

## 2020-12-08 PROBLEM — J12.82 PNEUMONIA DUE TO COVID-19 VIRUS: Status: ACTIVE | Noted: 2020-12-08

## 2020-12-08 PROBLEM — U07.1 PNEUMONIA DUE TO COVID-19 VIRUS: Status: ACTIVE | Noted: 2020-12-08

## 2020-12-08 PROCEDURE — 25010000006 BAMLANIVIMAB 20 ML VIAL: Performed by: INTERNAL MEDICINE

## 2020-12-08 PROCEDURE — 99220 PR INITIAL OBSERVATION CARE/DAY 70 MINUTES: CPT | Performed by: INTERNAL MEDICINE

## 2020-12-08 PROCEDURE — M0239 BAMLANIVIMAB-XXXX INFUSION: HCPCS | Performed by: INTERNAL MEDICINE

## 2020-12-08 RX ORDER — EPINEPHRINE 1 MG/ML
0.3 INJECTION, SOLUTION INTRAMUSCULAR; SUBCUTANEOUS ONCE AS NEEDED
Status: DISCONTINUED | OUTPATIENT
Start: 2020-12-08 | End: 2020-12-08 | Stop reason: HOSPADM

## 2020-12-08 RX ORDER — SODIUM CHLORIDE 9 MG/ML
40 INJECTION, SOLUTION INTRAVENOUS ONCE
Status: COMPLETED | OUTPATIENT
Start: 2020-12-08 | End: 2020-12-08

## 2020-12-08 RX ORDER — METHYLPREDNISOLONE SODIUM SUCCINATE 125 MG/2ML
125 INJECTION, POWDER, LYOPHILIZED, FOR SOLUTION INTRAMUSCULAR; INTRAVENOUS ONCE AS NEEDED
Status: DISCONTINUED | OUTPATIENT
Start: 2020-12-08 | End: 2020-12-08 | Stop reason: HOSPADM

## 2020-12-08 RX ORDER — DIPHENHYDRAMINE HYDROCHLORIDE 50 MG/ML
50 INJECTION INTRAMUSCULAR; INTRAVENOUS ONCE AS NEEDED
Status: DISCONTINUED | OUTPATIENT
Start: 2020-12-08 | End: 2020-12-08 | Stop reason: HOSPADM

## 2020-12-08 RX ADMIN — SODIUM CHLORIDE 700 MG: 9 INJECTION, SOLUTION INTRAVENOUS at 15:14

## 2020-12-08 RX ADMIN — SODIUM CHLORIDE 40 ML: 900 INJECTION INTRAVENOUS at 15:15

## 2020-12-08 NOTE — DISCHARGE INSTRUCTIONS
The infusion clinic for monoclonal antibody should contact you tomorrow.  Return if your O2 sats are 94% or lower consistently with the O2 sat monitor if sent home with you.

## 2020-12-08 NOTE — ED PROVIDER NOTES
"Subjective   Ms. Koenig 70 old female comes to the emergency room today complaining of having some shortness of breath.  She was diagnosed with COVID-19 about 7 days ago.  She was seen here last night for similar complaints she had a complication from her pacemaker and was concerned maybe she had fluid around her heart again.  She had low-grade fevers.  She had no rigors.  She reports that she had some body aches but seems little better she mostly does have an episodic shortness of breath and feels like there is \"water runnin Through her veins.  She has had no history of chronic lung disease.  She does have a pacemaker in for previous heart block.  She does state that she is extremely anxious about this and wonders if anxiety is playing some role in this.  She denies chest pain orthopnea or PND no pedal edema.  No prior history of heart failure.      History provided by:  Patient   used: No    Shortness of Breath  Severity:  Moderate  Onset quality:  Gradual  Timing:  Intermittent  Progression:  Waxing and waning  Chronicity:  New  Context: URI    Context comment:  Diagnosed with COVID-19 about 7 days ago.  Relieved by:  Rest  Worsened by:  Coughing  Ineffective treatments:  None tried  Associated symptoms: cough and sputum production    Associated symptoms: no abdominal pain, no chest pain, no claudication, no diaphoresis, no ear pain, no headaches, no hemoptysis, no neck pain, no PND, no rash, no sore throat, no syncope, no swollen glands, no vomiting and no wheezing    Risk factors: no recent alcohol use, no family hx of DVT, no hx of cancer, no hx of PE/DVT, no obesity, no prolonged immobilization and no recent surgery        Review of Systems   Constitutional: Negative for diaphoresis.   HENT: Negative for ear pain and sore throat.    Respiratory: Positive for cough, sputum production and shortness of breath. Negative for hemoptysis, chest tightness and wheezing.    Cardiovascular: Negative " for chest pain, claudication, syncope and PND.   Gastrointestinal: Negative for abdominal pain and vomiting.   Musculoskeletal: Negative for neck pain.   Skin: Negative for rash.   Neurological: Negative for headaches.   Psychiatric/Behavioral: Negative.    All other systems reviewed and are negative.      Past Medical History:   Diagnosis Date   • H/O active rheumatic fever    • Heart block    • Pacemaker    • Wears glasses        No Known Allergies    Past Surgical History:   Procedure Laterality Date   • APPENDECTOMY     • BACK SURGERY  2015   • CATARACT EXTRACTION     • PACEMAKER IMPLANTATION  2020   • TONSILLECTOMY         Family History   Problem Relation Age of Onset   • No Known Problems Father    • Arrhythmia Brother    • Cancer Brother    • No Known Problems Maternal Grandfather    • No Known Problems Paternal Grandmother    • Arrhythmia Paternal Grandfather    • Stroke Paternal Grandfather        Social History     Socioeconomic History   • Marital status:      Spouse name: Not on file   • Number of children: Not on file   • Years of education: Not on file   • Highest education level: Not on file   Tobacco Use   • Smoking status: Never Smoker   • Smokeless tobacco: Never Used   Substance and Sexual Activity   • Alcohol use: Never     Frequency: Never   • Drug use: Defer   • Sexual activity: Defer   Social History Narrative    Caffeine coffee 3 servings dt pepsi and dt coke and tea           Objective   Physical Exam  Vitals signs and nursing note reviewed.   Constitutional:       General: She is not in acute distress.     Appearance: She is well-developed. She is not diaphoretic.      Comments: Warm pink dry afebrile nontoxic   HENT:      Head: Normocephalic and atraumatic.      Nose: Nose normal.   Eyes:      General: No scleral icterus.     Conjunctiva/sclera: Conjunctivae normal.   Neck:      Musculoskeletal: Normal range of motion and neck supple.   Cardiovascular:      Rate and Rhythm: Normal  rate and regular rhythm.      Heart sounds: Normal heart sounds. No murmur.   Pulmonary:      Effort: Pulmonary effort is normal. No respiratory distress.      Breath sounds: Normal breath sounds. No decreased breath sounds, wheezing, rhonchi or rales.   Chest:      Chest wall: No mass, deformity, tenderness, crepitus or edema. There is no dullness to percussion.   Abdominal:      General: Bowel sounds are normal.      Palpations: Abdomen is soft.      Tenderness: There is no abdominal tenderness.   Musculoskeletal: Normal range of motion.   Skin:     General: Skin is warm and dry.   Neurological:      Mental Status: She is alert and oriented to person, place, and time.   Psychiatric:         Behavior: Behavior normal.         Procedures           ED Course                                 CT angiogram was negative except for very mild pulmonary infiltrates.  No pulmonary emboli.  Her O2 sats are running 96 to 98% without oxygen.  I have given her an O2 sat monitor for home.  I have contacted the monoclonal antibody infusion people there to call her tomorrow to see if she is a candidate for an infusion of that.  We will start her on Decadron.  Advised her if her O2 sats are running 94% consistently or lower to return for reevaluation possible admission.  She verbalized understanding and is pleased with this plan.          MDM  Number of Diagnoses or Management Options  Pneumonia due to COVID-19 virus: new and requires workup  SOB (shortness of breath): new and requires workup     Amount and/or Complexity of Data Reviewed  Clinical lab tests: reviewed and ordered  Tests in the radiology section of CPT®: reviewed and ordered  Tests in the medicine section of CPT®: reviewed and ordered  Discuss the patient with other providers: yes    Patient Progress  Patient progress: stable      Final diagnoses:   Pneumonia due to COVID-19 virus   SOB (shortness of breath)            Donal Triplett PA  12/10/20 3680

## 2020-12-08 NOTE — H&P
[facility] Medicine Services  Clinical Decision Unit (CDU)  Outpatient H&P for Bamlanivimab Infusion    Patient Name: Haven Koenig  : 1950  MRN: [id number]  Primary Care Physician: Kajal Snyder MD      Subjective   Subjective     Chief Complaint:  COVID    HPI:  Haven Koenig is a 70 y.o. female with a history of pacemaker implantation in July of this year but is otherwise fairly healthy and active who presents for bamlanvimab infusion.  She first began to have symptoms of Covid on .  Started with a cough, congestion, headache.  Low-grade temperatures to 99.9.  Some mild diarrhea that has improved.  Denies any changes to her taste or smell.  She has monitored herself on pulse ox it is ranged from 94 to 96%.  She believes she got it from her daughter at home.  She sought test in [place] on  which is positive and I have personally reviewed that positive test result.  She was in the emergency room on  for continued to feeling bad.  CT of her chest at that time showed very mild bilateral patchy infiltrates consistent with Covid pneumonia.  Looks like she was discharged on Decadron but has not yet filled that medication.  There is been no interval change in her symptoms since discharge from the emergency room yesterday.  She was referred by the ER provider for this infusion.    Review of Systems   Gen- + fevers, chills  CV- No chest pain, palpitations  Resp- + cough, +dyspnea  GI- No N/V/D, abd pain    All other systems reviewed and negative    Personal History     Past Medical History:   Diagnosis Date   • H/O active rheumatic fever    • Heart block    • Pacemaker    • Wears glasses    Pacemaker in July.    Past Surgical History:   Procedure Laterality Date   • APPENDECTOMY     • BACK SURGERY     • CATARACT EXTRACTION     • PACEMAKER IMPLANTATION     • TONSILLECTOMY         Family History: family history includes Arrhythmia in her brother and paternal  grandfather; Cancer in her brother; No Known Problems in her father, maternal grandfather, and paternal grandmother; Stroke in her paternal grandfather. Otherwise pertinent FHx was reviewed and unremarkable.     Social History:  reports that she has never smoked. She has never used smokeless tobacco. Drug use questions deferred to the physician. She reports that she does not drink alcohol.  Social History     Social History Narrative    Caffeine coffee 3 servings dt pepsi and dt coke and tea   No smoke.  No EtOH.  Lives in [place] by herself.   is .    Medications:  Available home medication information reviewed.  Medications Prior to Admission   Medication Sig Dispense Refill Last Dose   • dexamethasone (DECADRON) 4 MG tablet Take 1.5 tablets by mouth Daily. 6 tablet 0    • loratadine (Claritin) 10 MG tablet Take 10 mg by mouth Daily.      • Multiple Vitamin (MULTI-VITAMIN DAILY PO) Take  by mouth.      • propranolol (INDERAL) 20 MG tablet Take 20 mg by mouth 2 (Two) Times a Day.      • venlafaxine XR (EFFEXOR-XR) 75 MG 24 hr capsule Take 75 mg by mouth Daily.      • vitamin B-12 (CYANOCOBALAMIN) 1000 MCG tablet Take 1,000 mcg by mouth Daily.          No Known Allergies    Objective   Objective     Vital Signs:   Temp:  [98 °F (36.7 °C)] 98 °F (36.7 °C)  Heart Rate:  [77] 77  Resp:  [16] 16  BP: (114)/(72) 114/72        Physical Exam   With patient's consent, physical exam was conducted via visual telemedicine encounter due to patient's current isolation requirements in the interest of PPE conservation.    Constitutional: No acute distress, awake, alert, nontoxic, normal body habitus, able to hold conversation and phone  HENT: NCAT, MMM, no conjunctival injection  Respiratory: Good effort, nonlabored respirations on RA  Cardiovascular:  tele with NSR  Musculoskeletal: No edema, normal muscle tone and mass for age  Psychiatric: Appropriate affect, good insight and judgement, cooperative  Neurologic:  Oriented x 3, movements symmetric BUE and BLE, speech clear and fluent  Skin: No visible rashes, no jaundice seen on exposed skin through window        Results Reviewed:  CT angiogram of her chest yesterday showed mild bilateral patchy infiltrates, did review personally  CBC and BMP from yesterday unremarkable    Assessment/Plan   Assessment / Plan     There are no active hospital problems to display for this patient.      Plan:  - Patient meets the following criteria for Bamlanivimab infusion under the FDA EUA: Age greater than 65  - Infusion has been ordered to be given over 1 hour with a post-infusion monitoring period of 1 hour.  - Patient will be monitored closely by nursing with plans to immediately stop infusion if there is evidence of anaphylaxis or a severe infusion reaction.  - PRN medications of Epinephrine, Benadryl, and Solumedrol have been ordered       Consent:  The FDA has issued an Emergency Use Authorization (EUA) to permit emergency use of the unapproved product bamlanivimab for treatment of laboratory confirmed COVID-19 in certain ambulatory patients. There is no prescribing information or package insert, however, the FDA has authorized distribution of Fact Sheets for patients and/or caregivers for additional information on this investigational therapy. I have provided the Fact Sheet to and discussed the following with the patient and he/she agreed to proceed:  • FDA has authorized the emergency use (EUA) of bamlanivimab, which is not an FDA approved drug  • The patient has the option to accept or refuse bamlanivimab at any time  • The significant known and potential risks and benefits of bamlanivimab and the extent to which such risks and benefits are unknown  • Information on available alternative treatments and the risks and benefits of those alternatives        CODE STATUS:    There are no questions and answers to display.         Discharge Blueprint (criteria for discharge):   1. Completion  of infusion of Bamlanivimab.  2. Completion of one hour monitoring post-infusion.  3. No evidence of anaphylaxis or severe reaction.     Won Calixto MD  12/08/20

## 2020-12-08 NOTE — PLAN OF CARE
Goal Outcome Evaluation:  Plan of Care Reviewed With: patient     Outcome Summary: Patient admitted to receive monoclonal antibody infusion for Covid 19. Pt c/o SOA on exertion and occasional productive cough. VSS. will monitor closely during/after infusion.

## 2021-02-26 ENCOUNTER — IMMUNIZATION (OUTPATIENT)
Dept: VACCINE CLINIC | Facility: HOSPITAL | Age: 71
End: 2021-02-26

## 2021-02-26 PROCEDURE — 91300 HC SARSCOV02 VAC 30MCG/0.3ML IM: CPT | Performed by: INTERNAL MEDICINE

## 2021-02-26 PROCEDURE — 0001A: CPT | Performed by: INTERNAL MEDICINE

## 2021-03-11 PROCEDURE — 93294 REM INTERROG EVL PM/LDLS PM: CPT | Performed by: INTERNAL MEDICINE

## 2021-03-11 PROCEDURE — 93296 REM INTERROG EVL PM/IDS: CPT | Performed by: INTERNAL MEDICINE

## 2021-03-16 ENCOUNTER — OFFICE VISIT (OUTPATIENT)
Dept: CARDIOLOGY | Facility: CLINIC | Age: 71
End: 2021-03-16

## 2021-03-16 VITALS
HEART RATE: 83 BPM | WEIGHT: 146 LBS | BODY MASS INDEX: 24.32 KG/M2 | DIASTOLIC BLOOD PRESSURE: 66 MMHG | OXYGEN SATURATION: 94 % | SYSTOLIC BLOOD PRESSURE: 114 MMHG | HEIGHT: 65 IN

## 2021-03-16 DIAGNOSIS — Z95.0 PRESENCE OF CARDIAC PACEMAKER: ICD-10-CM

## 2021-03-16 DIAGNOSIS — I44.39 HIGH DEGREE ATRIOVENTRICULAR BLOCK: Primary | ICD-10-CM

## 2021-03-16 PROCEDURE — 99214 OFFICE O/P EST MOD 30 MIN: CPT | Performed by: INTERNAL MEDICINE

## 2021-03-16 PROCEDURE — 93280 PM DEVICE PROGR EVAL DUAL: CPT | Performed by: INTERNAL MEDICINE

## 2021-03-16 RX ORDER — PROMETHAZINE HYDROCHLORIDE 12.5 MG/1
12.5 TABLET ORAL AS NEEDED
COMMUNITY
Start: 2021-01-05

## 2021-03-16 NOTE — PROGRESS NOTES
Haven Koenig  1950  PCP: Kajal Snyder MD    SUBJECTIVE:   Haven Koenig is a 70 y.o. female seen for a consultation visit regarding the following:     Chief Complaint:   No chief complaint on file.       HPI:  Patient has been cardiac stable. No CP or SOB      History:  This is 69-year-old female with history of symptomatic bradycardia/heart block status post permanent pacemaker on 7/14/2020 by Dr. Back at [facility].  Patient presented the ED on 7/24/2020 with complaints of dizziness.  Patient had permanent pacemaker on 7/14/2020 for 2-1 heart block with a heart rate in the low 30s.  The procedure was complicated by a small right ventricular perforation and a pericardial drain was placed.  The drain was removed after 2 days the patient was discharged home on colchicine.  Patient was discharged feeling well until the day of the ED visit when she started having several episodes of lightheadedness with associated nausea and shortness of breath.  CTA chest showed normal lead position with no other significant abnormalities.  CT head negative.  She reports that dizziness has now resolved.  She has no shortness of breath or chest pain.  She has some residual soreness at the pacemaker site.  Overall, she is very active and healthy.      Cardiac PMH: (Old records have been reviewed and summarized below)  1.  Transient AV/complete heart block  2.  [place] [employer] dual-chamber pacemaker placement-July 2020 - by Dr. Back at [facility].  3.  Pacemaker placement complication with perforation of the RV lead requiring pericardial drain    Past Medical History, Past Surgical History, Family history, Social History, and Medications were all reviewed with the patient today and updated as necessary.       Current Outpatient Medications:   •  Ascorbic Acid (VITAMIN C PO), Take 1 tablet by mouth Daily., Disp: , Rfl:   •  loratadine (Claritin) 10 MG tablet, Take 10 mg by mouth Daily., Disp: , Rfl:   •   "Multiple Vitamin (MULTI-VITAMIN DAILY PO), Take  by mouth., Disp: , Rfl:   •  Multiple Vitamins-Minerals (ZINC PO), Take 1 tablet by mouth Daily., Disp: , Rfl:   •  promethazine (PHENERGAN) 12.5 MG tablet, Take 12.5 mg by mouth As Needed., Disp: , Rfl:   •  propranolol (INDERAL) 20 MG tablet, Take 20 mg by mouth 2 (Two) Times a Day., Disp: , Rfl:   •  venlafaxine XR (EFFEXOR-XR) 75 MG 24 hr capsule, Take 75 mg by mouth Daily., Disp: , Rfl:   •  vitamin B-12 (CYANOCOBALAMIN) 1000 MCG tablet, Take 1,000 mcg by mouth Daily., Disp: , Rfl:     No Known Allergies      Past Medical History:   Diagnosis Date   • H/O active rheumatic fever    • Heart block    • Pacemaker    • Wears glasses      Past Surgical History:   Procedure Laterality Date   • APPENDECTOMY     • BACK SURGERY  2015   • CATARACT EXTRACTION     • PACEMAKER IMPLANTATION  2020   • TONSILLECTOMY       Family History   Problem Relation Age of Onset   • No Known Problems Father    • Arrhythmia Brother    • Cancer Brother    • No Known Problems Maternal Grandfather    • No Known Problems Paternal Grandmother    • Arrhythmia Paternal Grandfather    • Stroke Paternal Grandfather      Social History     Tobacco Use   • Smoking status: Never Smoker   • Smokeless tobacco: Never Used   Substance Use Topics   • Alcohol use: Never            PHYSICAL EXAM:   /66 (BP Location: Left arm, Patient Position: Sitting)   Pulse 83   Ht 165.1 cm (65\")   Wt 66.2 kg (146 lb)   SpO2 94%   BMI 24.30 kg/m²      Wt Readings from Last 5 Encounters:   03/16/21 66.2 kg (146 lb)   12/08/20 63.5 kg (140 lb)   12/07/20 63.5 kg (140 lb)   12/06/20 63.5 kg (140 lb)   08/25/20 62.1 kg (137 lb)     BP Readings from Last 5 Encounters:   03/16/21 114/66   12/08/20 134/63   12/07/20 119/68   12/06/20 113/82   08/25/20 90/55       General-Well Nourished, Well developed  Eyes - PERRLA  Neck- supple, No mass  CV- regular rate and rhythm, no MRG  Lung- clear bilaterally  Abd- soft, " +BS  Musc/skel - Norm strength and range of motion  Skin- warm and dry  Neuro - Alert & Oriented x 3, appropriate mood.    Medical problems and test results were reviewed with the patient today.     Results for orders placed or performed during the hospital encounter of 12/07/20   Comprehensive Metabolic Panel    Specimen: Blood   Result Value Ref Range    Glucose 95 65 - 99 mg/dL    BUN 13 8 - 23 mg/dL    Creatinine 0.75 0.57 - 1.00 mg/dL    Sodium 140 136 - 145 mmol/L    Potassium 3.6 3.5 - 5.2 mmol/L    Chloride 103 98 - 107 mmol/L    CO2 25.0 22.0 - 29.0 mmol/L    Calcium 9.8 8.6 - 10.5 mg/dL    Total Protein 8.0 6.0 - 8.5 g/dL    Albumin 4.00 3.50 - 5.20 g/dL    ALT (SGPT) 27 1 - 33 U/L    AST (SGOT) 26 1 - 32 U/L    Alkaline Phosphatase 80 39 - 117 U/L    Total Bilirubin 0.2 0.0 - 1.2 mg/dL    eGFR Non African Amer 76 >60 mL/min/1.73    Globulin 4.0 gm/dL    A/G Ratio 1.0 g/dL    BUN/Creatinine Ratio 17.3 7.0 - 25.0    Anion Gap 12.0 5.0 - 15.0 mmol/L   BNP    Specimen: Blood   Result Value Ref Range    proBNP 372.4 0.0 - 900.0 pg/mL   Troponin    Specimen: Blood   Result Value Ref Range    Troponin T <0.010 0.000 - 0.030 ng/mL   CBC Auto Differential    Specimen: Blood   Result Value Ref Range    WBC 7.20 3.40 - 10.80 10*3/mm3    RBC 5.11 3.77 - 5.28 10*6/mm3    Hemoglobin 14.8 12.0 - 15.9 g/dL    Hematocrit 46.8 (H) 34.0 - 46.6 %    MCV 91.6 79.0 - 97.0 fL    MCH 29.0 26.6 - 33.0 pg    MCHC 31.6 31.5 - 35.7 g/dL    RDW 13.6 12.3 - 15.4 %    RDW-SD 46.6 37.0 - 54.0 fl    MPV 9.2 6.0 - 12.0 fL    Platelets 267 140 - 450 10*3/mm3    Neutrophil % 62.2 42.7 - 76.0 %    Lymphocyte % 26.8 19.6 - 45.3 %    Monocyte % 9.4 5.0 - 12.0 %    Eosinophil % 1.0 0.3 - 6.2 %    Basophil % 0.3 0.0 - 1.5 %    Immature Grans % 0.3 0.0 - 0.5 %    Neutrophils, Absolute 4.48 1.70 - 7.00 10*3/mm3    Lymphocytes, Absolute 1.93 0.70 - 3.10 10*3/mm3    Monocytes, Absolute 0.68 0.10 - 0.90 10*3/mm3    Eosinophils, Absolute 0.07 0.00 -  0.40 10*3/mm3    Basophils, Absolute 0.02 0.00 - 0.20 10*3/mm3    Immature Grans, Absolute 0.02 0.00 - 0.05 10*3/mm3    nRBC 0.0 0.0 - 0.2 /100 WBC   ECG 12 Lead   Result Value Ref Range    QT Interval 374 ms    QTC Interval 415 ms   Light Blue Top   Result Value Ref Range    Extra Tube hold for add-on    Green Top (Gel)   Result Value Ref Range    Extra Tube Hold for add-ons.    Lavender Top   Result Value Ref Range    Extra Tube hold for add-on    Gold Top - SST   Result Value Ref Range    Extra Tube Hold for add-ons.          No results found for: CHOL, HDL, HDLC, LDL, LDLC, VLDL    EKG:  (EKG/Tracing has been independently visualized by me and summarized below)    Procedures    ASSESSMENT and PLAN  1.  Transient AV block with symptomatic bradycardia-status post dual-chamber pacemaker-now is stable  2.  Dual-chamber pacemaker-[place] [employer]-stable function  3.  Cardiac tamponade at the time of pacemaker placement by Dr. Back at [facility]-status post drain-has resolved.    Return in about 6 months (around 9/16/2021) for office visit and device check with [facility].            Gerard Iverson M.D., F.A.C.C, F.H.R.S.  Cardiology/Electrophysiology  03/16/21  14:50 EDT

## 2021-03-19 ENCOUNTER — IMMUNIZATION (OUTPATIENT)
Dept: VACCINE CLINIC | Facility: HOSPITAL | Age: 71
End: 2021-03-19

## 2021-03-19 PROCEDURE — 91300 HC SARSCOV02 VAC 30MCG/0.3ML IM: CPT | Performed by: INTERNAL MEDICINE

## 2021-03-19 PROCEDURE — 0002A: CPT | Performed by: INTERNAL MEDICINE

## 2021-04-12 ENCOUNTER — TRANSCRIBE ORDERS (OUTPATIENT)
Dept: ADMINISTRATIVE | Facility: HOSPITAL | Age: 71
End: 2021-04-12

## 2021-04-12 DIAGNOSIS — Z12.31 VISIT FOR SCREENING MAMMOGRAM: Primary | ICD-10-CM

## 2021-07-12 ENCOUNTER — TRANSCRIBE ORDERS (OUTPATIENT)
Dept: ADMINISTRATIVE | Facility: HOSPITAL | Age: 71
End: 2021-07-12

## 2021-07-12 DIAGNOSIS — M85.89 OTHER SPECIFIED DISORDERS OF BONE DENSITY AND STRUCTURE, MULTIPLE SITES: Primary | ICD-10-CM

## 2021-09-09 PROCEDURE — 93294 REM INTERROG EVL PM/LDLS PM: CPT | Performed by: STUDENT IN AN ORGANIZED HEALTH CARE EDUCATION/TRAINING PROGRAM

## 2021-09-09 PROCEDURE — 93296 REM INTERROG EVL PM/IDS: CPT | Performed by: STUDENT IN AN ORGANIZED HEALTH CARE EDUCATION/TRAINING PROGRAM

## 2021-10-05 ENCOUNTER — OFFICE VISIT (OUTPATIENT)
Dept: CARDIOLOGY | Facility: CLINIC | Age: 71
End: 2021-10-05

## 2021-10-05 VITALS
OXYGEN SATURATION: 92 % | DIASTOLIC BLOOD PRESSURE: 64 MMHG | WEIGHT: 144 LBS | HEART RATE: 71 BPM | BODY MASS INDEX: 23.99 KG/M2 | HEIGHT: 65 IN | SYSTOLIC BLOOD PRESSURE: 132 MMHG

## 2021-10-05 DIAGNOSIS — I44.39 HIGH DEGREE ATRIOVENTRICULAR BLOCK: Primary | ICD-10-CM

## 2021-10-05 DIAGNOSIS — R06.09 DOE (DYSPNEA ON EXERTION): ICD-10-CM

## 2021-10-05 DIAGNOSIS — Z95.0 PRESENCE OF CARDIAC PACEMAKER: ICD-10-CM

## 2021-10-05 PROCEDURE — 93280 PM DEVICE PROGR EVAL DUAL: CPT | Performed by: PHYSICIAN ASSISTANT

## 2021-10-05 PROCEDURE — 99213 OFFICE O/P EST LOW 20 MIN: CPT | Performed by: PHYSICIAN ASSISTANT

## 2021-10-05 NOTE — PROGRESS NOTES
"        Encounter Date:10/05/2021      Patient ID: Haven Koenig is a 71 y.o. female.    Kajal Snyder MD    Chief Complaint: AV BLOCK, Pericardial Effusion, and ICD      PROBLEM LIST:  Patient Active Problem List    Diagnosis Date Noted   • High degree atrioventricular block 08/04/2020     Priority: High     Note Last Updated: 10/5/2021     · Transient AV/complete heart block  · [place] [employer] dual-chamber pacemaker placement-July 2020 - by Dr. Back at [facility].  · Pacemaker placement complication with perforation of the RV lead requiring pericardial drain       • Presence of cardiac pacemaker 08/21/2020     Priority: Medium   • Pericardial effusion, acute 08/04/2020     Priority: Low     Note Last Updated: 8/4/2020     During PPM implant with smal RV perforation s/p pericardiocentesis/drain     • Pneumonia due to COVID-19 virus 12/08/2020               History of Present Illness  Patient presents today for follow-up with a history of transient high degree AV block with subsequent dual-chamber permanent pacemaker implantation which was complicated by perforation of the RV lead requiring a pericardial drain.  She presents today for scheduled follow-up.  She has no current complaint of exertional chest pain however is complaining of some exertional dyspnea for the past 1 to 2 months while on her daily walks.  This is not associated with chest pressure, resolves rapidly with rest and is not reproduced with other significant physical activities such as her house remodeling projects.  She had an echocardiogram just a year ago which was unremarkable.  She denies orthopnea, PND, claudication, lower extremity edema.  She has had no awareness of tachyarrhythmias but states she had 2-3 nights of sleeplessness due to \"hard beats\" which kept her awake.  This is resolved and not recurred.  Her blood pressures remain within normal range.  She takes propanolol 20 mg once daily for benign tremors.    No Known " "Allergies    Current Outpatient Medications   Medication Instructions   • Ascorbic Acid (VITAMIN C PO) 1 tablet, Oral, Daily   • loratadine (CLARITIN) 10 mg, Oral, Daily   • Multiple Vitamin (MULTI-VITAMIN DAILY PO) Oral   • Multiple Vitamins-Minerals (ZINC PO) 1 tablet, Oral, Daily   • promethazine (PHENERGAN) 12.5 mg, Oral, As Needed   • propranolol (INDERAL) 20 mg, Oral, Daily   • venlafaxine XR (EFFEXOR-XR) 75 mg, Oral, Daily   • vitamin B-12 (CYANOCOBALAMIN) 1,000 mcg, Oral, Daily       The following portions of the patient's history were reviewed and updated as appropriate: allergies, current medications, past family history, past medical history, past social history, past surgical history and problem list.  .    Objective:     /64   Pulse 71   Ht 165.1 cm (65\")   Wt 65.3 kg (144 lb)   SpO2 92%   BMI 23.96 kg/m²    Body mass index is 23.96 kg/m².     Constitutional:       Appearance: Well-developed.   Pulmonary:      Effort: Pulmonary effort is normal. No respiratory distress.      Breath sounds: Normal breath sounds. No wheezing. No rales.      Comments: Bases clear  Chest:      Chest wall: Not tender to palpatation.   Cardiovascular:      Normal rate. Regular rhythm.      Murmurs: There is no murmur.      No gallop. No click. No rub.   Pulses:     Intact distal pulses.   Edema:     Peripheral edema absent.   Musculoskeletal: Normal range of motion.       Lab Review:     Device interrogation  [place] [employer] dual-chamber permanent pacemaker DDDR, rate 60  Right atrium is 6% paced, P equals 4 mV, threshold 0.7 V at 0.4 ms, impedance 752 ohms  RV is 30% paced, R equals 8.1 mV, threshold 1 V at 0.4 ms, impedance 859 ohms  Battery voltage 7.5 years next line events: All mode switching was far field oversensing.  There was 1 \"NSVT\" at 164 bpm.  Reprogramming: Adjusted far field  Normal device function      Assessment:      Diagnosis Plan   1. High degree atrioventricular block   normal device function " "with far field oversensing and a single NSVT which does not account for the patient's symptoms of \"hard beats\".  I have advised her should she have repeat symptoms she may take an extra dose of propanolol   2. Presence of cardiac pacemaker   normal device function   3. FUNG (dyspnea on exertion)   I do not feel that this is an anginal equivalent and most probably secondary to deconditioning.  If she should develop progressive symptoms or yanira midsternal chest pain I would reevaluate with a myocardial perfusion study     Plan:     Stable cardiac status.  Continue current medications.   in 6 months, sooner as needed.  Thank you for allowing us to participate in the care of your patient.     Electronically signed by SENG Vogel, 10/05/21, 2:25 PM EDT.      Please note that portions of this note may have been completed with a voice recognition program. Efforts were made to edit the dictations, but occasionally words are mis-translated.  "

## 2021-10-26 ENCOUNTER — IMMUNIZATION (OUTPATIENT)
Dept: VACCINE CLINIC | Facility: HOSPITAL | Age: 71
End: 2021-10-26

## 2021-10-26 PROCEDURE — 91300 HC SARSCOV02 VAC 30MCG/0.3ML IM: CPT | Performed by: INTERNAL MEDICINE

## 2021-10-26 PROCEDURE — 0004A ADM SARSCOV2 30MCG/0.3ML BOOSTER: CPT | Performed by: INTERNAL MEDICINE

## 2021-11-19 ENCOUNTER — APPOINTMENT (OUTPATIENT)
Dept: BONE DENSITY | Facility: HOSPITAL | Age: 71
End: 2021-11-19

## 2021-11-19 DIAGNOSIS — M85.89 OTHER SPECIFIED DISORDERS OF BONE DENSITY AND STRUCTURE, MULTIPLE SITES: ICD-10-CM

## 2021-11-19 PROCEDURE — 77080 DXA BONE DENSITY AXIAL: CPT

## 2021-12-09 PROCEDURE — 93296 REM INTERROG EVL PM/IDS: CPT | Performed by: STUDENT IN AN ORGANIZED HEALTH CARE EDUCATION/TRAINING PROGRAM

## 2021-12-09 PROCEDURE — 93294 REM INTERROG EVL PM/LDLS PM: CPT | Performed by: STUDENT IN AN ORGANIZED HEALTH CARE EDUCATION/TRAINING PROGRAM

## 2022-01-13 ENCOUNTER — LAB (OUTPATIENT)
Dept: LAB | Facility: HOSPITAL | Age: 72
End: 2022-01-13

## 2022-01-13 ENCOUNTER — TRANSCRIBE ORDERS (OUTPATIENT)
Dept: LAB | Facility: HOSPITAL | Age: 72
End: 2022-01-13

## 2022-01-13 DIAGNOSIS — Z12.5 SPECIAL SCREENING FOR MALIGNANT NEOPLASM OF PROSTATE: ICD-10-CM

## 2022-01-13 DIAGNOSIS — R73.9 BLOOD GLUCOSE ELEVATED: ICD-10-CM

## 2022-01-13 DIAGNOSIS — R94.5 NONSPECIFIC ABNORMAL RESULTS OF LIVER FUNCTION STUDY: Primary | ICD-10-CM

## 2022-01-13 DIAGNOSIS — E78.2 MIXED HYPERLIPIDEMIA: ICD-10-CM

## 2022-01-13 DIAGNOSIS — R94.5 NONSPECIFIC ABNORMAL RESULTS OF LIVER FUNCTION STUDY: ICD-10-CM

## 2022-01-13 DIAGNOSIS — E55.9 VITAMIN D DEFICIENCY: ICD-10-CM

## 2022-01-13 LAB
25(OH)D3 SERPL-MCNC: 32.2 NG/ML (ref 30–100)
ALBUMIN SERPL-MCNC: 4.1 G/DL (ref 3.5–5.2)
ALBUMIN/GLOB SERPL: 1.6 G/DL
ALP SERPL-CCNC: 70 U/L (ref 39–117)
ALT SERPL W P-5'-P-CCNC: 10 U/L (ref 1–33)
ANION GAP SERPL CALCULATED.3IONS-SCNC: 7.3 MMOL/L (ref 5–15)
AST SERPL-CCNC: 14 U/L (ref 1–32)
BILIRUB SERPL-MCNC: 0.2 MG/DL (ref 0–1.2)
BUN SERPL-MCNC: 13 MG/DL (ref 8–23)
BUN/CREAT SERPL: 16.5 (ref 7–25)
CALCIUM SPEC-SCNC: 9.4 MG/DL (ref 8.6–10.5)
CHLORIDE SERPL-SCNC: 104 MMOL/L (ref 98–107)
CHOLEST SERPL-MCNC: 251 MG/DL (ref 0–200)
CO2 SERPL-SCNC: 29.7 MMOL/L (ref 22–29)
CREAT SERPL-MCNC: 0.79 MG/DL (ref 0.57–1)
GFR SERPL CREATININE-BSD FRML MDRD: 71 ML/MIN/1.73
GLOBULIN UR ELPH-MCNC: 2.5 GM/DL
GLUCOSE SERPL-MCNC: 80 MG/DL (ref 65–99)
HBA1C MFR BLD: 6.46 % (ref 4.8–5.6)
HCV AB SER DONR QL: NORMAL
HDLC SERPL-MCNC: 39 MG/DL (ref 40–60)
LDLC SERPL CALC-MCNC: 176 MG/DL (ref 0–100)
LDLC/HDLC SERPL: 4.45 {RATIO}
POTASSIUM SERPL-SCNC: 4.3 MMOL/L (ref 3.5–5.2)
PROT SERPL-MCNC: 6.6 G/DL (ref 6–8.5)
SODIUM SERPL-SCNC: 141 MMOL/L (ref 136–145)
TRIGL SERPL-MCNC: 192 MG/DL (ref 0–150)
VLDLC SERPL-MCNC: 36 MG/DL (ref 5–40)

## 2022-01-13 PROCEDURE — 80061 LIPID PANEL: CPT

## 2022-01-13 PROCEDURE — 83036 HEMOGLOBIN GLYCOSYLATED A1C: CPT

## 2022-01-13 PROCEDURE — 36415 COLL VENOUS BLD VENIPUNCTURE: CPT

## 2022-01-13 PROCEDURE — 86803 HEPATITIS C AB TEST: CPT

## 2022-01-13 PROCEDURE — 80053 COMPREHEN METABOLIC PANEL: CPT

## 2022-01-13 PROCEDURE — 82306 VITAMIN D 25 HYDROXY: CPT

## 2022-03-10 PROCEDURE — 93296 REM INTERROG EVL PM/IDS: CPT | Performed by: STUDENT IN AN ORGANIZED HEALTH CARE EDUCATION/TRAINING PROGRAM

## 2022-03-10 PROCEDURE — 93294 REM INTERROG EVL PM/LDLS PM: CPT | Performed by: STUDENT IN AN ORGANIZED HEALTH CARE EDUCATION/TRAINING PROGRAM

## 2022-04-26 ENCOUNTER — OFFICE VISIT (OUTPATIENT)
Dept: CARDIOLOGY | Facility: CLINIC | Age: 72
End: 2022-04-26

## 2022-04-26 VITALS
DIASTOLIC BLOOD PRESSURE: 64 MMHG | WEIGHT: 145 LBS | OXYGEN SATURATION: 97 % | SYSTOLIC BLOOD PRESSURE: 130 MMHG | BODY MASS INDEX: 24.16 KG/M2 | HEART RATE: 73 BPM | HEIGHT: 65 IN

## 2022-04-26 DIAGNOSIS — I44.39 HIGH DEGREE ATRIOVENTRICULAR BLOCK: Primary | ICD-10-CM

## 2022-04-26 DIAGNOSIS — R53.83 FATIGUE, UNSPECIFIED TYPE: ICD-10-CM

## 2022-04-26 PROCEDURE — 99213 OFFICE O/P EST LOW 20 MIN: CPT | Performed by: NURSE PRACTITIONER

## 2022-04-26 PROCEDURE — 93000 ELECTROCARDIOGRAM COMPLETE: CPT | Performed by: NURSE PRACTITIONER

## 2022-04-26 RX ORDER — METFORMIN HYDROCHLORIDE 500 MG/1
500 TABLET, EXTENDED RELEASE ORAL DAILY
COMMUNITY
Start: 2022-02-18

## 2022-04-26 NOTE — PROGRESS NOTES
Cardiac Electrophysiology Outpatient Note  [facility]    Office Visit     Haven Koenig  [id number]  04/26/2024    Primary Care Physician: Kajal Snyder MD    Referred By: No ref. provider found    CC:   Chief Complaint   Patient presents with   • High degree atrioventricular block       Problem List:  1. Transient AV block/ Complete heart block  a. Status post [place] [employer] dual-chamber PPM 7/2020 Dr. Back  b. Cardiac tamponade, perforation of RV lead requiring pericardial drain  2. History of rheumatic fever  3. COVID-19 12/2020  4. Surgical history:  a. Appendectomy   b. back surgery 2015  c. Cataract extraction  d. Tonsillectomy    History of Present Illness:   Haven Koenig is a 71 y.o. female who presents to the electrophysiology clinic for follow up of transient complete heart block status post dual-chamber pacemaker implant which was complicated by perforation of the RV lead requiring a pericardial drain.  She was last seen in the office in October 2021. She report some increased fatigue and shortness of breath with activity. Denies chest pain, palpitations, lower extremity edema, syncope.     Past Surgical History:   Procedure Laterality Date   • APPENDECTOMY     • BACK SURGERY  2015   • CATARACT EXTRACTION     • PACEMAKER IMPLANTATION  2020   • TONSILLECTOMY         Family History   Problem Relation Age of Onset   • No Known Problems Father    • Rheum arthritis Sister    • Stroke Sister    • Arrhythmia Brother    • Cancer Brother    • No Known Problems Maternal Grandfather    • No Known Problems Paternal Grandmother    • Arrhythmia Paternal Grandfather    • Stroke Paternal Grandfather        Social History     Socioeconomic History   • Marital status:    Tobacco Use   • Smoking status: Never Smoker   • Smokeless tobacco: Never Used   Vaping Use   • Vaping Use: Never used   Substance and Sexual Activity   • Alcohol use: Never   • Drug use: Defer  "  • Sexual activity: Defer         Current Outpatient Medications:   •  loratadine (CLARITIN) 10 MG tablet, Take 10 mg by mouth Daily., Disp: , Rfl:   •  metFORMIN ER (GLUCOPHAGE-XR) 500 MG 24 hr tablet, Take 500 mg by mouth Daily., Disp: , Rfl:   •  promethazine (PHENERGAN) 12.5 MG tablet, Take 12.5 mg by mouth As Needed., Disp: , Rfl:   •  propranolol (INDERAL) 20 MG tablet, Take 20 mg by mouth Daily., Disp: , Rfl:   •  venlafaxine XR (EFFEXOR-XR) 75 MG 24 hr capsule, Take 75 mg by mouth Daily., Disp: , Rfl:     Allergies:   No Known Allergies    Review of Systems:  Review of Systems   Constitutional: Positive for malaise/fatigue.   Cardiovascular: Positive for dyspnea on exertion. Negative for chest pain, irregular heartbeat, leg swelling, near-syncope, palpitations and syncope.   Respiratory: Negative for shortness of breath.    Neurological: Negative for light-headedness.        Vital Signs: Blood pressure 130/64, pulse 73, height 165.1 cm (65\"), weight 65.8 kg (145 lb), SpO2 97 %.    Physical Exam  Vitals and nursing note reviewed.   Cardiovascular:      Rate and Rhythm: Normal rate and regular rhythm.      Heart sounds: Normal heart sounds.   Pulmonary:      Effort: Pulmonary effort is normal.      Breath sounds: Normal breath sounds.   Skin:     General: Skin is warm and dry.   Neurological:      Mental Status: She is alert and oriented to person, place, and time.   Psychiatric:         Behavior: Behavior is cooperative.         Lab Results   Component Value Date    GLUCOSE 80 01/13/2022    CALCIUM 9.4 01/13/2022     01/13/2022    K 4.3 01/13/2022    CO2 29.7 (H) 01/13/2022     01/13/2022    BUN 13 01/13/2022    CREATININE 0.79 01/13/2022    EGFRIFNONA 71 01/13/2022    BCR 16.5 01/13/2022    ANIONGAP 7.3 01/13/2022     Lab Results   Component Value Date    WBC 7.20 12/07/2020    HGB 14.8 12/07/2020    HCT 46.8 (H) 12/07/2020    MCV 91.6 12/07/2020     12/07/2020     No results found for: " INR, PROTIME  No results found for: TSH, W2CHYOW, A8HLXBF, THYROIDAB     Results for orders placed during the hospital encounter of 08/25/20    Adult Transthoracic Echo Complete W/ Cont if Necessary Per Protocol    Interpretation Summary  · Estimated EF appears to be in the range of 56 - 60%.  · There is a trivial pericardial effusion.      I personally viewed and interpreted the patient's EKG/Telemetry/lab data.      ECG 12 Lead    Date/Time: 4/26/2022 2:20 PM  Performed by: Laverne Pathak APRN  Authorized by: Laverne Pathak APRN   Comparison: compared with previous ECG from 12/7/2020  Similar to previous ECG  Rhythm: sinus rhythm  Ectopy: infrequent PVCs  Rate: normal  BPM: 74  Conduction: right bundle branch block          Device check:  wikifolio PPM, mode DDDR, rate 60 RA pacing 5% RV pacing 79%.  Device changes today: RR adjusted, increased MSR to 145 &   Assessment & Plan    1. High degree atrioventricular block  - Normal device check today. No events reported.  - Continue current medication regimen.    2. Fatigue, unspecified type  - Discussed her fatigue, feels like she is unable to keep up with the normal activities she used to do. Recently went to [place] and was unable to go on hikes like she wanted. Continue to be active but has felt like she needed to slow down in the last year or so.   - PPM settings adjusted as above, discussed that this may help with activity intolerance  - Discussed the option of repeating an echo for further investigation of this, but we will hold off for now. If she continue to have fatigue with new device settings we will do another echo.       Follow Up:  Return in about 6 months (around 10/26/2022). with KAYLA Quinoens

## 2022-06-16 ENCOUNTER — TRANSCRIBE ORDERS (OUTPATIENT)
Dept: ADMINISTRATIVE | Facility: HOSPITAL | Age: 72
End: 2022-06-16

## 2022-06-16 DIAGNOSIS — Z12.31 ENCOUNTER FOR SCREENING MAMMOGRAM FOR BREAST CANCER: Primary | ICD-10-CM

## 2022-06-24 ENCOUNTER — TELEPHONE (OUTPATIENT)
Dept: CARDIOLOGY | Facility: CLINIC | Age: 72
End: 2022-06-24

## 2022-06-24 NOTE — TELEPHONE ENCOUNTER
Pt called to ask if she can use a TENS unit to her Rt lower back, near SI joint, for back pain. Pt has a C Accolade device. I advised patient to contact customer service @ [employer] for information regarding a TENS unit & her device, contact phone # provided. Pt verbalized understanding & compliance.

## 2022-09-08 PROCEDURE — 93294 REM INTERROG EVL PM/LDLS PM: CPT | Performed by: STUDENT IN AN ORGANIZED HEALTH CARE EDUCATION/TRAINING PROGRAM

## 2022-09-08 PROCEDURE — 93296 REM INTERROG EVL PM/IDS: CPT | Performed by: STUDENT IN AN ORGANIZED HEALTH CARE EDUCATION/TRAINING PROGRAM

## 2022-11-08 ENCOUNTER — OFFICE VISIT (OUTPATIENT)
Dept: CARDIOLOGY | Facility: CLINIC | Age: 72
End: 2022-11-08

## 2022-11-08 VITALS
HEART RATE: 71 BPM | SYSTOLIC BLOOD PRESSURE: 130 MMHG | OXYGEN SATURATION: 98 % | DIASTOLIC BLOOD PRESSURE: 76 MMHG | BODY MASS INDEX: 23.26 KG/M2 | HEIGHT: 65 IN | WEIGHT: 139.6 LBS

## 2022-11-08 DIAGNOSIS — R53.83 FATIGUE, UNSPECIFIED TYPE: ICD-10-CM

## 2022-11-08 DIAGNOSIS — Z95.0 PRESENCE OF CARDIAC PACEMAKER: ICD-10-CM

## 2022-11-08 DIAGNOSIS — I44.39 HIGH DEGREE ATRIOVENTRICULAR BLOCK: ICD-10-CM

## 2022-11-08 DIAGNOSIS — Z95.0 PRESENCE OF CARDIAC PACEMAKER: Primary | ICD-10-CM

## 2022-11-08 DIAGNOSIS — I44.39 HIGH DEGREE ATRIOVENTRICULAR BLOCK: Primary | ICD-10-CM

## 2022-11-08 PROCEDURE — 93280 PM DEVICE PROGR EVAL DUAL: CPT | Performed by: STUDENT IN AN ORGANIZED HEALTH CARE EDUCATION/TRAINING PROGRAM

## 2022-11-08 PROCEDURE — 99214 OFFICE O/P EST MOD 30 MIN: CPT | Performed by: STUDENT IN AN ORGANIZED HEALTH CARE EDUCATION/TRAINING PROGRAM

## 2022-11-08 NOTE — PROGRESS NOTES
Cardiac Electrophysiology Outpatient Note  [facility] at [facility]    Office Visit     Haven Koenig  [id number]  04/26/2024    Primary Care Physician: Kajal Snyder MD    Referred By: No ref. provider found    CC:   Chief Complaint   Patient presents with   • High degree atrioventricular block       Problem List:  1. Transient AV block/ Complete heart block  a. Status post [place] [employer] dual-chamber PPM 7/2020 Dr. Back  b. Cardiac tamponade, perforation of RV lead requiring pericardial drain  2. History of rheumatic fever  3. COVID-19 12/2020  4. Surgical history:  a. Appendectomy   b. back surgery 2015  c. Cataract extraction  d. Tonsillectomy    History of Present Illness:   Haven Koenig is a 72 y.o. female who presents to the electrophysiology clinic for follow up of transient complete heart block status post dual-chamber pacemaker implant which was complicated by perforation of the RV lead requiring a pericardial drain.  She was last seen in the office approximately 6 months ago.  At that time she was having some dyspnea on exertion and her weight response was increased to see if this would help.  She does think that maybe is improved a little bit.  However she continues to have fatigue and dyspnea on exertion.  She notices this most with episodes going up stairs.  She denies any chest pain, orthopnea, PND, or lower extremity swelling.    Past Surgical History:   Procedure Laterality Date   • APPENDECTOMY     • BACK SURGERY  2015   • CATARACT EXTRACTION     • INSERT / REPLACE / REMOVE PACEMAKER  7/20   • PACEMAKER IMPLANTATION  2020   • TONSILLECTOMY         Family History   Problem Relation Age of Onset   • No Known Problems Father    • Rheum arthritis Sister    • Stroke Sister    • Arrhythmia Brother    • Cancer Brother    • No Known Problems Maternal Grandfather    • No Known Problems Paternal Grandmother    • Arrhythmia Paternal Grandfather    • Stroke Paternal  "Grandfather        Social History     Socioeconomic History   • Marital status:    Tobacco Use   • Smoking status: Never   • Smokeless tobacco: Never   Vaping Use   • Vaping Use: Never used   Substance and Sexual Activity   • Alcohol use: Never   • Drug use: Never   • Sexual activity: Not Currently         Current Outpatient Medications:   •  loratadine (CLARITIN) 10 MG tablet, Take 10 mg by mouth Daily., Disp: , Rfl:   •  metFORMIN ER (GLUCOPHAGE-XR) 500 MG 24 hr tablet, Take 500 mg by mouth Daily., Disp: , Rfl:   •  promethazine (PHENERGAN) 12.5 MG tablet, Take 12.5 mg by mouth As Needed., Disp: , Rfl:   •  propranolol (INDERAL) 20 MG tablet, Take 20 mg by mouth Daily., Disp: , Rfl:   •  venlafaxine XR (EFFEXOR-XR) 75 MG 24 hr capsule, Take 75 mg by mouth Daily., Disp: , Rfl:     Allergies:   No Known Allergies    Review of Systems:  Review of Systems   Constitutional: Positive for malaise/fatigue.   Cardiovascular: Positive for dyspnea on exertion. Negative for chest pain, irregular heartbeat, leg swelling, near-syncope, palpitations and syncope.   Respiratory: Negative for shortness of breath.    Neurological: Negative for light-headedness.        Vital Signs: Blood pressure 130/76, pulse 71, height 165.1 cm (65\"), weight 63.3 kg (139 lb 9.6 oz), SpO2 98 %.    Physical Exam  Vitals and nursing note reviewed.   Cardiovascular:      Rate and Rhythm: Normal rate and regular rhythm.      Heart sounds: Normal heart sounds.   Pulmonary:      Effort: Pulmonary effort is normal.      Breath sounds: Normal breath sounds.   Skin:     General: Skin is warm and dry.   Neurological:      Mental Status: She is alert and oriented to person, place, and time.   Psychiatric:         Behavior: Behavior is cooperative.         Lab Results   Component Value Date    GLUCOSE 80 01/13/2022    CALCIUM 9.4 01/13/2022     01/13/2022    K 4.3 01/13/2022    CO2 29.7 (H) 01/13/2022     01/13/2022    BUN 13 01/13/2022    " CREATININE 0.79 01/13/2022    EGFRIFNONA 71 01/13/2022    BCR 16.5 01/13/2022    ANIONGAP 7.3 01/13/2022     Lab Results   Component Value Date    WBC 7.20 12/07/2020    HGB 14.8 12/07/2020    HCT 46.8 (H) 12/07/2020    MCV 91.6 12/07/2020     12/07/2020     No results found for: INR, PROTIME  No results found for: TSH, R8RFSKQ, D0GTUPR, THYROIDAB     Results for orders placed during the hospital encounter of 08/25/20    Adult Transthoracic Echo Complete W/ Cont if Necessary Per Protocol    Interpretation Summary  · Estimated EF appears to be in the range of 56 - 60%.  · There is a trivial pericardial effusion.      I personally viewed and interpreted the patient's EKG/Telemetry/lab data.    Assessment & Plan    1. High degree atrioventricular block  -Her [place] [employer] dual-chamber pacemaker was interrogated today.  She has approximately 6 and half years of battery life remaining.  She is pacing 13% of time the atrium and 91% of the time in the ventricle.  When looking at her trends she has been almost exclusively ventricular paced for the past couple of months.  I assume that her heart block is worsening require high ventricular pacing.  No changes were made to her settings today.      2. Fatigue, unspecified type  -She continues to have fatigue and dyspnea on exertion.  Given she has a now high burden of ventricular pacing this could represent development of RV pacing due to cardiomyopathy.  We will therefore check an echo to evaluate this further.  We discussed that should her ejection fraction we reduced that we may need to consider biventricular pacemaker upgrade..       Follow Up:  Return in about 6 months (around 5/8/2023) for Recheck. with Dr. Juan Carlos Rangel MD

## 2022-11-23 ENCOUNTER — TRANSCRIBE ORDERS (OUTPATIENT)
Dept: ADMINISTRATIVE | Facility: HOSPITAL | Age: 72
End: 2022-11-23

## 2022-11-23 DIAGNOSIS — Z12.31 ENCOUNTER FOR SCREENING MAMMOGRAM FOR MALIGNANT NEOPLASM OF BREAST: Primary | ICD-10-CM

## 2022-12-02 ENCOUNTER — HOSPITAL ENCOUNTER (OUTPATIENT)
Dept: CARDIOLOGY | Facility: HOSPITAL | Age: 72
Discharge: HOME | End: 2022-12-02
Admitting: STUDENT IN AN ORGANIZED HEALTH CARE EDUCATION/TRAINING PROGRAM

## 2022-12-02 VITALS
HEIGHT: 65 IN | SYSTOLIC BLOOD PRESSURE: 155 MMHG | DIASTOLIC BLOOD PRESSURE: 77 MMHG | BODY MASS INDEX: 22.99 KG/M2 | WEIGHT: 138 LBS

## 2022-12-02 DIAGNOSIS — Z95.0 PRESENCE OF CARDIAC PACEMAKER: ICD-10-CM

## 2022-12-02 DIAGNOSIS — I44.39 HIGH DEGREE ATRIOVENTRICULAR BLOCK: ICD-10-CM

## 2022-12-02 LAB
BH CV ECHO MEAS - AO MAX PG: 4.5 MMHG
BH CV ECHO MEAS - AO MEAN PG: 2.39 MMHG
BH CV ECHO MEAS - AO ROOT DIAM: 3 CM
BH CV ECHO MEAS - AO V2 MAX: 106 CM/SEC
BH CV ECHO MEAS - AO V2 VTI: 24.2 CM
BH CV ECHO MEAS - AVA(I,D): 2.37 CM2
BH CV ECHO MEAS - EDV(CUBED): 74.9 ML
BH CV ECHO MEAS - EDV(MOD-SP2): 91 ML
BH CV ECHO MEAS - EDV(MOD-SP4): 71.6 ML
BH CV ECHO MEAS - EF(MOD-SP4): 53.8 %
BH CV ECHO MEAS - ESV(CUBED): 25.8 ML
BH CV ECHO MEAS - ESV(MOD-SP2): 29.3 ML
BH CV ECHO MEAS - ESV(MOD-SP4): 33.1 ML
BH CV ECHO MEAS - FS: 29.9 %
BH CV ECHO MEAS - IVS/LVPW: 1 CM
BH CV ECHO MEAS - IVSD: 1.1 CM
BH CV ECHO MEAS - LA DIMENSION: 3.6 CM
BH CV ECHO MEAS - LV MASS(C)D: 158.3 GRAMS
BH CV ECHO MEAS - LV MAX PG: 2.09 MMHG
BH CV ECHO MEAS - LV MEAN PG: 1.1 MMHG
BH CV ECHO MEAS - LV V1 MAX: 72.2 CM/SEC
BH CV ECHO MEAS - LV V1 VTI: 17.7 CM
BH CV ECHO MEAS - LVIDD: 4.2 CM
BH CV ECHO MEAS - LVIDS: 3 CM
BH CV ECHO MEAS - LVOT AREA: 3.2 CM2
BH CV ECHO MEAS - LVOT DIAM: 2.03 CM
BH CV ECHO MEAS - LVPWD: 1.1 CM
BH CV ECHO MEAS - MR MAX PG: 72.2 MMHG
BH CV ECHO MEAS - MR MAX VEL: 424.8 CM/SEC
BH CV ECHO MEAS - MV DEC SLOPE: 421.1 CM/SEC2
BH CV ECHO MEAS - MV DEC TIME: 0.27 MSEC
BH CV ECHO MEAS - MV E MAX VEL: 113 CM/SEC
BH CV ECHO MEAS - MV MAX PG: 5.6 MMHG
BH CV ECHO MEAS - MV MEAN PG: 2.39 MMHG
BH CV ECHO MEAS - MV V2 VTI: 30.5 CM
BH CV ECHO MEAS - MVA(VTI): 1.88 CM2
BH CV ECHO MEAS - PA ACC TIME: 0.14 SEC
BH CV ECHO MEAS - PA PR(ACCEL): 17.2 MMHG
BH CV ECHO MEAS - PA V2 MAX: 78.2 CM/SEC
BH CV ECHO MEAS - RAP SYSTOLE: 8 MMHG
BH CV ECHO MEAS - RVSP: 31 MMHG
BH CV ECHO MEAS - SV(LVOT): 57.4 ML
BH CV ECHO MEAS - SV(MOD-SP2): 61.7 ML
BH CV ECHO MEAS - SV(MOD-SP4): 38.5 ML
BH CV ECHO MEAS - TAPSE (>1.6): 1.87 CM
BH CV ECHO MEAS - TR MAX PG: 23 MMHG
BH CV ECHO MEAS - TR MAX VEL: 240 CM/SEC
BH CV XLRA - RV BASE: 3 CM
BH CV XLRA - RV LENGTH: 5.1 CM
BH CV XLRA - RV MID: 2.45 CM
LEFT ATRIUM VOLUME INDEX: 44 ML/M2
MAXIMAL PREDICTED HEART RATE: 146 BPM
STRESS TARGET HR: 124 BPM

## 2022-12-02 PROCEDURE — 93306 TTE W/DOPPLER COMPLETE: CPT

## 2022-12-02 PROCEDURE — 93306 TTE W/DOPPLER COMPLETE: CPT | Performed by: INTERNAL MEDICINE

## 2022-12-08 PROCEDURE — 93294 REM INTERROG EVL PM/LDLS PM: CPT | Performed by: STUDENT IN AN ORGANIZED HEALTH CARE EDUCATION/TRAINING PROGRAM

## 2022-12-08 PROCEDURE — 93296 REM INTERROG EVL PM/IDS: CPT | Performed by: STUDENT IN AN ORGANIZED HEALTH CARE EDUCATION/TRAINING PROGRAM

## 2022-12-13 ENCOUNTER — TRANSCRIBE ORDERS (OUTPATIENT)
Dept: LAB | Facility: HOSPITAL | Age: 72
End: 2022-12-13

## 2022-12-13 ENCOUNTER — LAB (OUTPATIENT)
Dept: LAB | Facility: HOSPITAL | Age: 72
End: 2022-12-13

## 2022-12-13 DIAGNOSIS — E11.9 DIABETES MELLITUS WITHOUT COMPLICATION: ICD-10-CM

## 2022-12-13 DIAGNOSIS — E78.2 MIXED HYPERLIPIDEMIA: Primary | ICD-10-CM

## 2022-12-13 DIAGNOSIS — E55.9 VITAMIN D DEFICIENCY: ICD-10-CM

## 2022-12-13 DIAGNOSIS — E78.2 MIXED HYPERLIPIDEMIA: ICD-10-CM

## 2022-12-13 DIAGNOSIS — R53.83 TIREDNESS: ICD-10-CM

## 2022-12-13 LAB
25(OH)D3 SERPL-MCNC: 43.9 NG/ML (ref 30–100)
ALBUMIN SERPL-MCNC: 4.3 G/DL (ref 3.5–5.2)
ALBUMIN/GLOB SERPL: 1.6 G/DL
ALP SERPL-CCNC: 69 U/L (ref 39–117)
ALT SERPL W P-5'-P-CCNC: 10 U/L (ref 1–33)
ANION GAP SERPL CALCULATED.3IONS-SCNC: 8.7 MMOL/L (ref 5–15)
AST SERPL-CCNC: 21 U/L (ref 1–32)
BILIRUB SERPL-MCNC: 0.3 MG/DL (ref 0–1.2)
BUN SERPL-MCNC: 18 MG/DL (ref 8–23)
BUN/CREAT SERPL: 22.5 (ref 7–25)
CALCIUM SPEC-SCNC: 9.9 MG/DL (ref 8.6–10.5)
CHLORIDE SERPL-SCNC: 102 MMOL/L (ref 98–107)
CHOLEST SERPL-MCNC: 224 MG/DL (ref 0–200)
CO2 SERPL-SCNC: 29.3 MMOL/L (ref 22–29)
CREAT SERPL-MCNC: 0.8 MG/DL (ref 0.57–1)
DEPRECATED RDW RBC AUTO: 40.2 FL (ref 37–54)
EGFRCR SERPLBLD CKD-EPI 2021: 77.4 ML/MIN/1.73
ERYTHROCYTE [DISTWIDTH] IN BLOOD BY AUTOMATED COUNT: 12.1 % (ref 12.3–15.4)
GLOBULIN UR ELPH-MCNC: 2.7 GM/DL
GLUCOSE SERPL-MCNC: 84 MG/DL (ref 65–99)
HBA1C MFR BLD: 5.9 % (ref 4.8–5.6)
HCT VFR BLD AUTO: 41.5 % (ref 34–46.6)
HDLC SERPL-MCNC: 41 MG/DL (ref 40–60)
HGB BLD-MCNC: 14.3 G/DL (ref 12–15.9)
IRON 24H UR-MRATE: 121 MCG/DL (ref 37–145)
IRON SATN MFR SERPL: 31 % (ref 20–50)
LDLC SERPL CALC-MCNC: 142 MG/DL (ref 0–100)
LDLC/HDLC SERPL: 3.37 {RATIO}
MCH RBC QN AUTO: 30.4 PG (ref 26.6–33)
MCHC RBC AUTO-ENTMCNC: 34.5 G/DL (ref 31.5–35.7)
MCV RBC AUTO: 88.1 FL (ref 79–97)
PLATELET # BLD AUTO: 310 10*3/MM3 (ref 140–450)
PMV BLD AUTO: 9.6 FL (ref 6–12)
POTASSIUM SERPL-SCNC: 4.9 MMOL/L (ref 3.5–5.2)
PROT SERPL-MCNC: 7 G/DL (ref 6–8.5)
RBC # BLD AUTO: 4.71 10*6/MM3 (ref 3.77–5.28)
SODIUM SERPL-SCNC: 140 MMOL/L (ref 136–145)
TIBC SERPL-MCNC: 390 MCG/DL (ref 298–536)
TRANSFERRIN SERPL-MCNC: 262 MG/DL (ref 200–360)
TRIGL SERPL-MCNC: 224 MG/DL (ref 0–150)
VIT B12 BLD-MCNC: 1021 PG/ML (ref 211–946)
VLDLC SERPL-MCNC: 41 MG/DL (ref 5–40)
WBC NRBC COR # BLD: 6.95 10*3/MM3 (ref 3.4–10.8)

## 2022-12-13 PROCEDURE — 84466 ASSAY OF TRANSFERRIN: CPT

## 2022-12-13 PROCEDURE — 83540 ASSAY OF IRON: CPT

## 2022-12-13 PROCEDURE — 82306 VITAMIN D 25 HYDROXY: CPT

## 2022-12-13 PROCEDURE — 82607 VITAMIN B-12: CPT

## 2022-12-13 PROCEDURE — 36415 COLL VENOUS BLD VENIPUNCTURE: CPT

## 2022-12-13 PROCEDURE — 80053 COMPREHEN METABOLIC PANEL: CPT

## 2022-12-13 PROCEDURE — 83036 HEMOGLOBIN GLYCOSYLATED A1C: CPT

## 2022-12-13 PROCEDURE — 85027 COMPLETE CBC AUTOMATED: CPT

## 2022-12-13 PROCEDURE — 80061 LIPID PANEL: CPT

## 2023-03-09 PROCEDURE — 93294 REM INTERROG EVL PM/LDLS PM: CPT | Performed by: STUDENT IN AN ORGANIZED HEALTH CARE EDUCATION/TRAINING PROGRAM

## 2023-03-09 PROCEDURE — 93296 REM INTERROG EVL PM/IDS: CPT | Performed by: STUDENT IN AN ORGANIZED HEALTH CARE EDUCATION/TRAINING PROGRAM

## 2023-03-23 ENCOUNTER — OFFICE VISIT (OUTPATIENT)
Dept: GASTROENTEROLOGY | Facility: CLINIC | Age: 73
End: 2023-03-23
Payer: MEDICARE

## 2023-03-23 VITALS
DIASTOLIC BLOOD PRESSURE: 68 MMHG | HEART RATE: 72 BPM | WEIGHT: 138 LBS | BODY MASS INDEX: 22.96 KG/M2 | OXYGEN SATURATION: 98 % | SYSTOLIC BLOOD PRESSURE: 120 MMHG

## 2023-03-23 DIAGNOSIS — Z12.11 ENCOUNTER FOR SCREENING FOR MALIGNANT NEOPLASM OF COLON: Primary | ICD-10-CM

## 2023-03-23 DIAGNOSIS — E11.9 TYPE 2 DIABETES MELLITUS WITHOUT COMPLICATION, WITHOUT LONG-TERM CURRENT USE OF INSULIN: ICD-10-CM

## 2023-03-23 PROCEDURE — 1160F RVW MEDS BY RX/DR IN RCRD: CPT | Performed by: NURSE PRACTITIONER

## 2023-03-23 PROCEDURE — S0285 CNSLT BEFORE SCREEN COLONOSC: HCPCS | Performed by: NURSE PRACTITIONER

## 2023-03-23 PROCEDURE — 1159F MED LIST DOCD IN RCRD: CPT | Performed by: NURSE PRACTITIONER

## 2023-03-23 RX ORDER — SODIUM CHLORIDE 9 MG/ML
70 INJECTION, SOLUTION INTRAVENOUS CONTINUOUS PRN
OUTPATIENT
Start: 2023-03-23

## 2023-03-23 RX ORDER — SODIUM PICOSULFATE, MAGNESIUM OXIDE, AND ANHYDROUS CITRIC ACID 10; 3.5; 12 MG/160ML; G/160ML; G/160ML
1 LIQUID ORAL TAKE AS DIRECTED
Qty: 320 ML | Refills: 0 | Status: SHIPPED | OUTPATIENT
Start: 2023-03-23

## 2023-03-23 RX ORDER — ONDANSETRON 4 MG/1
4 TABLET, FILM COATED ORAL EVERY 8 HOURS PRN
Qty: 6 TABLET | Refills: 0 | Status: SHIPPED | OUTPATIENT
Start: 2023-03-23

## 2023-03-23 NOTE — PROGRESS NOTES
New Patient Consult      Date: 2023   Patient Name: Haven Koenig  MRN: [id number]  : 1950     Primary Care Provider: Kajal Snyder MD    Chief Complaint   Patient presents with   • Colon Cancer Screening     History of Present Illness: Haven Koenig is a 72 y.o. female who is here today to establish care with gastroenterology for colon cancer screening.     The patient denies recent change in bowel habits. There is no diarrhea or constipation. There is no history of abdominal pain. There is no history of overt GI bleed (hematemesis melena or hematochezia). The patient denies nausea or vomiting. There is no history of reflux. The patient denies dysphagia or odynophagia. There is no history of recent significant weight loss. There is no history of liver disease in the past. There is no family history of GI malignancy. The patient's last colonoscopy was in the .    Subjective      Past Medical History:   Diagnosis Date   • H/O active rheumatic fever    • Wears glasses      Past Surgical History:   Procedure Laterality Date   • APPENDECTOMY     • BACK SURGERY     • CATARACT EXTRACTION     • COLONOSCOPY      last one in the    • INSERT / REPLACE / REMOVE PACEMAKER     • PACEMAKER IMPLANTATION     • TONSILLECTOMY       Family History   Problem Relation Age of Onset   • No Known Problems Father    • Rheum arthritis Sister    • Stroke Sister    • Arrhythmia Brother    • Cancer Brother    • No Known Problems Maternal Grandfather    • No Known Problems Paternal Grandmother    • Arrhythmia Paternal Grandfather    • Stroke Paternal Grandfather    • Colon cancer Neg Hx      Social History     Socioeconomic History   • Marital status:    Tobacco Use   • Smoking status: Never   • Smokeless tobacco: Never   Vaping Use   • Vaping Use: Never used   Substance and Sexual Activity   • Alcohol use: Never   • Drug use: Never   • Sexual activity: Not Currently       Current Outpatient  Medications:   •  loratadine (CLARITIN) 10 MG tablet, Take 1 tablet by mouth Daily., Disp: , Rfl:   •  metFORMIN ER (GLUCOPHAGE-XR) 500 MG 24 hr tablet, Take 1 tablet by mouth Daily., Disp: , Rfl:   •  promethazine (PHENERGAN) 12.5 MG tablet, Take 1 tablet by mouth As Needed., Disp: , Rfl:   •  propranolol (INDERAL) 20 MG tablet, Take 1 tablet by mouth Daily., Disp: , Rfl:   •  venlafaxine XR (EFFEXOR-XR) 75 MG 24 hr capsule, Take 1 capsule by mouth Daily., Disp: , Rfl:   •  ondansetron (ZOFRAN) 4 MG tablet, Take 1 tablet by mouth Every 8 (Eight) Hours As Needed for Nausea or Vomiting (with colonoscopy prep)., Disp: 6 tablet, Rfl: 0  •  Sod Picosulfate-Mag Ox-Cit Acd (Clenpiq) 10-3.5-12 MG-GM -GM/160ML solution, Take 160 mL by mouth Take As Directed. Use as directed for colonoscopy prep. ., Disp: 320 mL, Rfl: 0   No Known Allergies  The following portions of the patient's history were reviewed and updated as appropriate: allergies, current medications, past family history, past medical history, past social history, past surgical history and problem list.    Objective     Physical Exam  Vitals and nursing note reviewed.   Constitutional:       General: She is not in acute distress.     Appearance: Normal appearance. She is well-developed.   HENT:      Head: Normocephalic and atraumatic.      Mouth/Throat:      Mouth: Mucous membranes are not pale, not dry and not cyanotic.   Eyes:      General: Lids are normal.   Neck:      Trachea: Trachea normal.   Cardiovascular:      Rate and Rhythm: Normal rate.   Pulmonary:      Effort: Pulmonary effort is normal. No respiratory distress.      Breath sounds: Normal breath sounds.   Abdominal:      Tenderness: There is no abdominal tenderness.   Skin:     General: Skin is warm and dry.   Neurological:      Mental Status: She is alert and oriented to person, place, and time.   Psychiatric:         Mood and Affect: Mood normal.         Speech: Speech normal.         Behavior:  Behavior normal. Behavior is cooperative.       Vitals:    03/23/23 1302   BP: 120/68   Pulse: 72   SpO2: 98%   Weight: 62.6 kg (138 lb)     Body mass index is 22.96 kg/m².     Results Review:   I have reviewed the patient's new clinical and imaging results.    No visits with results within 90 Day(s) from this visit.   Latest known visit with results is:   Lab on 12/13/2022   Component Date Value Ref Range Status   • Glucose 12/13/2022 84  65 - 99 mg/dL Final   • BUN 12/13/2022 18  8 - 23 mg/dL Final   • Creatinine 12/13/2022 0.80  0.57 - 1.00 mg/dL Final   • Sodium 12/13/2022 140  136 - 145 mmol/L Final   • Potassium 12/13/2022 4.9  3.5 - 5.2 mmol/L Final   • Chloride 12/13/2022 102  98 - 107 mmol/L Final   • CO2 12/13/2022 29.3 (H)  22.0 - 29.0 mmol/L Final   • Calcium 12/13/2022 9.9  8.6 - 10.5 mg/dL Final   • Total Protein 12/13/2022 7.0  6.0 - 8.5 g/dL Final   • Albumin 12/13/2022 4.30  3.50 - 5.20 g/dL Final   • ALT (SGPT) 12/13/2022 10  1 - 33 U/L Final   • AST (SGOT) 12/13/2022 21  1 - 32 U/L Final   • Alkaline Phosphatase 12/13/2022 69  39 - 117 U/L Final   • Total Bilirubin 12/13/2022 0.3  0.0 - 1.2 mg/dL Final   • Globulin 12/13/2022 2.7  gm/dL Final   • A/G Ratio 12/13/2022 1.6  g/dL Final   • BUN/Creatinine Ratio 12/13/2022 22.5  7.0 - 25.0 Final   • Anion Gap 12/13/2022 8.7  5.0 - 15.0 mmol/L Final   • eGFR 12/13/2022 77.4  >60.0 mL/min/1.73 Final    National Kidney Foundation and American Society of Nephrology (ASN) Task Force recommended calculation based on the Chronic Kidney Disease Epidemiology Collaboration (CKD-EPI) equation refit without adjustment for race.   • Total Cholesterol 12/13/2022 224 (H)  0 - 200 mg/dL Final   • Triglycerides 12/13/2022 224 (H)  0 - 150 mg/dL Final   • HDL Cholesterol 12/13/2022 41  40 - 60 mg/dL Final   • LDL Cholesterol  12/13/2022 142 (H)  0 - 100 mg/dL Final   • VLDL Cholesterol 12/13/2022 41 (H)  5 - 40 mg/dL Final   • LDL/HDL Ratio 12/13/2022 3.37   Final   •  Hemoglobin A1C 12/13/2022 5.90 (H)  4.80 - 5.60 % Final   • Vitamin B-12 12/13/2022 1,021 (H)  211 - 946 pg/mL Final   • Iron 12/13/2022 121  37 - 145 mcg/dL Final   • Iron Saturation 12/13/2022 31  20 - 50 % Final   • Transferrin 12/13/2022 262  200 - 360 mg/dL Final   • TIBC 12/13/2022 390  298 - 536 mcg/dL Final   • 25 Hydroxy, Vitamin D 12/13/2022 43.9  30.0 - 100.0 ng/ml Final   • WBC 12/13/2022 6.95  3.40 - 10.80 10*3/mm3 Final   • RBC 12/13/2022 4.71  3.77 - 5.28 10*6/mm3 Final   • Hemoglobin 12/13/2022 14.3  12.0 - 15.9 g/dL Final   • Hematocrit 12/13/2022 41.5  34.0 - 46.6 % Final   • MCV 12/13/2022 88.1  79.0 - 97.0 fL Final   • MCH 12/13/2022 30.4  26.6 - 33.0 pg Final   • MCHC 12/13/2022 34.5  31.5 - 35.7 g/dL Final   • RDW 12/13/2022 12.1 (L)  12.3 - 15.4 % Final   • RDW-SD 12/13/2022 40.2  37.0 - 54.0 fl Final   • MPV 12/13/2022 9.6  6.0 - 12.0 fL Final   • Platelets 12/13/2022 310  140 - 450 10*3/mm3 Final      No radiology results for the last 90 days.     Assessment / Plan      1. Encounter for screening for malignant neoplasm of colon  There is no family history of GI malignancy. The patient's last colonoscopy was in the 1990's.  Colonoscopy for colon cancer screening.     - Case Request  - Sod Picosulfate-Mag Ox-Cit Acd (Clenpiq) 10-3.5-12 MG-GM -GM/160ML solution; Take 160 mL by mouth Take As Directed. Use as directed for colonoscopy prep. .  Dispense: 320 mL; Refill: 0  - ondansetron (ZOFRAN) 4 MG tablet; Take 1 tablet by mouth Every 8 (Eight) Hours As Needed for Nausea or Vomiting (with colonoscopy prep).  Dispense: 6 tablet; Refill: 0    Patient Instructions   1. Colonoscopy: The indications, technique, alternatives and potential risk and complications were discussed with the patient including but not limited to bleeding, perforations, missing lesions and anesthetic complications. The patient understands and wishes to proceed with the procedure and has given their verbal consent. Written  patient education information was given to the patient.   2. The patient will call if they have further questions before procedure.       Sena Garduno, APRN  3/23/2023    Please note that portions of this note may have been completed with a voice recognition program.

## 2023-03-24 PROBLEM — Z12.11 ENCOUNTER FOR SCREENING FOR MALIGNANT NEOPLASM OF COLON: Status: ACTIVE | Noted: 2023-03-24

## 2023-04-25 ENCOUNTER — OFFICE VISIT (OUTPATIENT)
Dept: CARDIOLOGY | Facility: CLINIC | Age: 73
End: 2023-04-25
Payer: MEDICARE

## 2023-04-25 VITALS
OXYGEN SATURATION: 98 % | DIASTOLIC BLOOD PRESSURE: 54 MMHG | BODY MASS INDEX: 23.16 KG/M2 | RESPIRATION RATE: 18 BRPM | WEIGHT: 139 LBS | SYSTOLIC BLOOD PRESSURE: 110 MMHG | HEART RATE: 70 BPM | HEIGHT: 65 IN

## 2023-04-25 DIAGNOSIS — I44.39 HIGH DEGREE ATRIOVENTRICULAR BLOCK: ICD-10-CM

## 2023-04-25 DIAGNOSIS — I30.9 PERICARDIAL EFFUSION, ACUTE: Primary | ICD-10-CM

## 2023-04-25 RX ORDER — ATORVASTATIN CALCIUM 20 MG/1
1 TABLET, FILM COATED ORAL DAILY
COMMUNITY
Start: 2023-04-13

## 2023-04-25 NOTE — PROGRESS NOTES
Cardiac Electrophysiology Outpatient Note  [facility] at [facility]    Office Visit     Haven Koenig  [id number]  04/26/2022    Primary Care Physician: Kajal Snyder MD      CC:   Chief Complaint   Patient presents with   • Follow-up     Presence of cardiac pacemaker       Problem List:  1. Transient AV block/ Complete heart block  a. Status post [employer] dual-chamber PPM 7/2020 Dr. Back  b. Cardiac tamponade, perforation of RV lead requiring pericardial drain  2. History of rheumatic fever  3. COVID-19 12/2020  4. Surgical history:  a. Appendectomy   b. back surgery 2015  c. Cataract extraction  d. Tonsillectomy    History of Present Illness:   Haven Koenig is a 72 y.o. female who presents to the electrophysiology clinic for follow up of transient complete heart block status post dual-chamber pacemaker implant which was complicated by perforation of the RV lead requiring a pericardial drain.  She was last seen in the office approximately 6 months ago.  Since we last saw the pt, pt denies any palpitation episodes,  CP, LH, and dizziness. Denies any hospitalizations, ER visits, bleeding, or TIA/CVA symptoms.  She does continue to have some fatigue with exertion.  She did state that this past fall she built a raised garden and was able to do this with digging and placing mulch and built it with wood.  She stays quite busy with family.  She is not having chest pain.  She still has some deconditioning and fatigue shortness of breath with exertion but she admits she has not been regularly exercising.  She had an echocardiogram last visit that was normal.        Past Surgical History:   Procedure Laterality Date   • APPENDECTOMY     • BACK SURGERY  2015   • CATARACT EXTRACTION     • COLONOSCOPY      last one in the 1990's   • INSERT / REPLACE / REMOVE PACEMAKER  7/20   • PACEMAKER IMPLANTATION  2020   • TONSILLECTOMY         Family History   Problem Relation Age of Onset   •  No Known Problems Father    • Rheum arthritis Sister    • Stroke Sister    • Arrhythmia Brother    • Cancer Brother    • No Known Problems Maternal Grandfather    • No Known Problems Paternal Grandmother    • Arrhythmia Paternal Grandfather    • Stroke Paternal Grandfather    • Colon cancer Neg Hx        Social History     Socioeconomic History   • Marital status:    Tobacco Use   • Smoking status: Never     Passive exposure: Never   • Smokeless tobacco: Never   Vaping Use   • Vaping Use: Never used   Substance and Sexual Activity   • Alcohol use: Never   • Drug use: Never   • Sexual activity: Not Currently         Current Outpatient Medications:   •  atorvastatin (LIPITOR) 20 MG tablet, Take 1 tablet by mouth Daily., Disp: , Rfl:   •  loratadine (CLARITIN) 10 MG tablet, Take 1 tablet by mouth As Needed., Disp: , Rfl:   •  metFORMIN ER (GLUCOPHAGE-XR) 500 MG 24 hr tablet, Take 1 tablet by mouth Daily., Disp: , Rfl:   •  ondansetron (ZOFRAN) 4 MG tablet, Take 1 tablet by mouth Every 8 (Eight) Hours As Needed for Nausea or Vomiting (with colonoscopy prep)., Disp: 6 tablet, Rfl: 0  •  promethazine (PHENERGAN) 12.5 MG tablet, Take 1 tablet by mouth As Needed., Disp: , Rfl:   •  propranolol (INDERAL) 20 MG tablet, Take 1 tablet by mouth 2 (Two) Times a Day., Disp: , Rfl:   •  Sod Picosulfate-Mag Ox-Cit Acd (Clenpiq) 10-3.5-12 MG-GM -GM/160ML solution, Take 160 mL by mouth Take As Directed. Use as directed for colonoscopy prep. ., Disp: 320 mL, Rfl: 0  •  venlafaxine XR (EFFEXOR-XR) 75 MG 24 hr capsule, Take 1 capsule by mouth Daily., Disp: , Rfl:     Allergies:   No Known Allergies    Review of Systems:  Review of Systems   Constitutional: Positive for malaise/fatigue.   Cardiovascular: Positive for dyspnea on exertion. Negative for chest pain, irregular heartbeat, leg swelling, near-syncope, palpitations and syncope.   Respiratory: Negative for shortness of breath.    Neurological: Negative for light-headedness.  "       Vital Signs: Blood pressure 110/54, pulse 70, resp. rate 18, height 165.1 cm (65\"), weight 63 kg (139 lb), SpO2 98 %.    Physical Exam  Vitals and nursing note reviewed.   Cardiovascular:      Rate and Rhythm: Normal rate and regular rhythm.      Heart sounds: Normal heart sounds.   Pulmonary:      Effort: Pulmonary effort is normal.      Breath sounds: Normal breath sounds.   Skin:     General: Skin is warm and dry.   Neurological:      Mental Status: She is alert and oriented to person, place, and time.   Psychiatric:         Behavior: Behavior is cooperative.         Lab Results   Component Value Date    GLUCOSE 84 12/13/2022    CALCIUM 9.9 12/13/2022     12/13/2022    K 4.9 12/13/2022    CO2 29.3 (H) 12/13/2022     12/13/2022    BUN 18 12/13/2022    CREATININE 0.80 12/13/2022    EGFRIFNONA 71 01/13/2022    BCR 22.5 12/13/2022    ANIONGAP 8.7 12/13/2022     Lab Results   Component Value Date    WBC 6.95 12/13/2022    HGB 14.3 12/13/2022    HCT 41.5 12/13/2022    MCV 88.1 12/13/2022     12/13/2022     No results found for: INR, PROTIME  No results found for: TSH, G3KPOOD, C8JCIPR, THYROIDAB     Results for orders placed during the hospital encounter of 12/02/22    Adult Transthoracic Echo Complete W/ Cont if Necessary Per Protocol    Interpretation Summary  •  Left ventricular systolic function is normal. Left ventricular ejection fraction appears to be 51 - 55%.  •  Left atrial volume is moderately increased.  •  Mild mitral valve regurgitation is present.  •  Mild tricuspid valve regurgitation is present      I personally viewed and interpreted the patient's EKG/Telemetry/lab data.  Device interrogation: Vital signs of a pacemaker.  Dual-chamber.  Mode DDDR.  Rate 60.  A paced 10%.  RV paced 98%.  Normal thresholds impedances battery voltage 6 years remaining.  Assessment & Plan    1. High degree atrioventricular block  -Her [employer] dual-chamber pacemaker was interrogated " "today.  Normal function rate response acceptable.    2. Fatigue, unspecified type  -Increased fatigue, Echocardiogram normal EF.  We offered her the option of pursuing a stress test.  She would like to decline for for this at this time.  She would like to consider this in the future if she cannot \"get in shape\".  She will start walking every day this summer.  If she has any change in symptoms she will let us know.    Electronically signed by SNEG Baltazar, 04/25/23, 3:17 PM EDT.  "

## 2023-09-07 PROCEDURE — 93294 REM INTERROG EVL PM/LDLS PM: CPT | Performed by: STUDENT IN AN ORGANIZED HEALTH CARE EDUCATION/TRAINING PROGRAM

## 2023-09-07 PROCEDURE — 93296 REM INTERROG EVL PM/IDS: CPT | Performed by: STUDENT IN AN ORGANIZED HEALTH CARE EDUCATION/TRAINING PROGRAM

## 2023-10-02 ENCOUNTER — TRANSCRIBE ORDERS (OUTPATIENT)
Dept: ADMINISTRATIVE | Facility: HOSPITAL | Age: 73
End: 2023-10-02
Payer: MEDICARE

## 2023-10-02 DIAGNOSIS — Z12.31 VISIT FOR SCREENING MAMMOGRAM: Primary | ICD-10-CM

## 2023-11-29 ENCOUNTER — LAB (OUTPATIENT)
Dept: LAB | Facility: HOSPITAL | Age: 73
End: 2023-11-29
Payer: MEDICARE

## 2023-11-29 ENCOUNTER — TRANSCRIBE ORDERS (OUTPATIENT)
Dept: LAB | Facility: HOSPITAL | Age: 73
End: 2023-11-29
Payer: MEDICARE

## 2023-11-29 DIAGNOSIS — E11.9 DIABETES MELLITUS WITHOUT COMPLICATION: ICD-10-CM

## 2023-11-29 DIAGNOSIS — E78.2 MIXED HYPERLIPIDEMIA: ICD-10-CM

## 2023-11-29 DIAGNOSIS — E53.8 BIOTIN-(PROPIONYL-COA-CARBOXYLASE) LIGASE DEFICIENCY: ICD-10-CM

## 2023-11-29 DIAGNOSIS — E55.9 VITAMIN D DEFICIENCY: ICD-10-CM

## 2023-11-29 DIAGNOSIS — E78.2 MIXED HYPERLIPIDEMIA: Primary | ICD-10-CM

## 2023-11-29 DIAGNOSIS — Z11.59 SCREENING EXAMINATION FOR POLIOMYELITIS: ICD-10-CM

## 2023-11-29 LAB
25(OH)D3 SERPL-MCNC: 57.5 NG/ML (ref 30–100)
ALBUMIN SERPL-MCNC: 4 G/DL (ref 3.5–5.2)
ALBUMIN UR-MCNC: <1.2 MG/DL
ALBUMIN/GLOB SERPL: 1.3 G/DL
ALP SERPL-CCNC: 76 U/L (ref 39–117)
ALT SERPL W P-5'-P-CCNC: 13 U/L (ref 1–33)
ANION GAP SERPL CALCULATED.3IONS-SCNC: 9.7 MMOL/L (ref 5–15)
AST SERPL-CCNC: 24 U/L (ref 1–32)
BILIRUB SERPL-MCNC: 0.2 MG/DL (ref 0–1.2)
BUN SERPL-MCNC: 12 MG/DL (ref 8–23)
BUN/CREAT SERPL: 15.4 (ref 7–25)
CALCIUM SPEC-SCNC: 9.6 MG/DL (ref 8.6–10.5)
CHLORIDE SERPL-SCNC: 103 MMOL/L (ref 98–107)
CHOLEST SERPL-MCNC: 160 MG/DL (ref 0–200)
CO2 SERPL-SCNC: 29.3 MMOL/L (ref 22–29)
CREAT SERPL-MCNC: 0.78 MG/DL (ref 0.57–1)
DEPRECATED RDW RBC AUTO: 40.7 FL (ref 37–54)
EGFRCR SERPLBLD CKD-EPI 2021: 79.3 ML/MIN/1.73
ERYTHROCYTE [DISTWIDTH] IN BLOOD BY AUTOMATED COUNT: 12.3 % (ref 12.3–15.4)
GLOBULIN UR ELPH-MCNC: 3 GM/DL
GLUCOSE SERPL-MCNC: 92 MG/DL (ref 65–99)
HCT VFR BLD AUTO: 41.1 % (ref 34–46.6)
HDLC SERPL-MCNC: 42 MG/DL (ref 40–60)
HGB BLD-MCNC: 13.8 G/DL (ref 12–15.9)
IRON 24H UR-MRATE: 102 MCG/DL (ref 37–145)
LDLC SERPL CALC-MCNC: 88 MG/DL (ref 0–100)
LDLC/HDLC SERPL: 1.97 {RATIO}
MCH RBC QN AUTO: 30.5 PG (ref 26.6–33)
MCHC RBC AUTO-ENTMCNC: 33.6 G/DL (ref 31.5–35.7)
MCV RBC AUTO: 90.9 FL (ref 79–97)
PLATELET # BLD AUTO: 318 10*3/MM3 (ref 140–450)
PMV BLD AUTO: 9.7 FL (ref 6–12)
POTASSIUM SERPL-SCNC: 4.6 MMOL/L (ref 3.5–5.2)
PROT SERPL-MCNC: 7 G/DL (ref 6–8.5)
RBC # BLD AUTO: 4.52 10*6/MM3 (ref 3.77–5.28)
SODIUM SERPL-SCNC: 142 MMOL/L (ref 136–145)
TRIGL SERPL-MCNC: 177 MG/DL (ref 0–150)
VIT B12 BLD-MCNC: 1091 PG/ML (ref 211–946)
VLDLC SERPL-MCNC: 30 MG/DL (ref 5–40)
WBC NRBC COR # BLD AUTO: 7.08 10*3/MM3 (ref 3.4–10.8)

## 2023-11-29 PROCEDURE — 80053 COMPREHEN METABOLIC PANEL: CPT

## 2023-11-29 PROCEDURE — 85027 COMPLETE CBC AUTOMATED: CPT

## 2023-11-29 PROCEDURE — 82607 VITAMIN B-12: CPT

## 2023-11-29 PROCEDURE — 82043 UR ALBUMIN QUANTITATIVE: CPT

## 2023-11-29 PROCEDURE — 82306 VITAMIN D 25 HYDROXY: CPT

## 2023-11-29 PROCEDURE — 86803 HEPATITIS C AB TEST: CPT

## 2023-11-29 PROCEDURE — 36415 COLL VENOUS BLD VENIPUNCTURE: CPT

## 2023-11-29 PROCEDURE — 80061 LIPID PANEL: CPT

## 2023-11-30 LAB
HCV AB SERPL QL IA: NORMAL
HCV IGG SERPL QL IA: NON REACTIVE

## 2024-01-18 ENCOUNTER — HOSPITAL ENCOUNTER (OUTPATIENT)
Dept: MAMMOGRAPHY | Facility: HOSPITAL | Age: 74
Discharge: HOME | End: 2024-01-18
Payer: MEDICARE

## 2024-01-29 ENCOUNTER — TELEPHONE (OUTPATIENT)
Dept: CARDIOLOGY | Facility: CLINIC | Age: 74
End: 2024-01-29
Payer: MEDICARE

## 2024-01-29 NOTE — TELEPHONE ENCOUNTER
Caller: Haven Koenig    Relationship: Self    Best call back number: [phone]    What is the best time to reach you: ANY     Who are you requesting to speak with (clinical staff, provider,  specific staff member): CLINICAL     What was the call regarding: PT IS NEEDING A REFERRAL SENT DROM DR. GOVEA TO DR. JORGE L PAINTER, DUE TO HER INSURANCE. LEASE REACH OUT TO FURTHER ADVISE. THANK YOU!

## 2024-03-07 PROCEDURE — 93294 REM INTERROG EVL PM/LDLS PM: CPT | Performed by: STUDENT IN AN ORGANIZED HEALTH CARE EDUCATION/TRAINING PROGRAM

## 2024-03-07 PROCEDURE — 93296 REM INTERROG EVL PM/IDS: CPT | Performed by: STUDENT IN AN ORGANIZED HEALTH CARE EDUCATION/TRAINING PROGRAM

## 2024-03-19 ENCOUNTER — TELEPHONE (OUTPATIENT)
Dept: CARDIOLOGY | Facility: CLINIC | Age: 74
End: 2024-03-19
Payer: MEDICARE

## 2024-03-19 NOTE — TELEPHONE ENCOUNTER
Caller: Haven Koenig    Relationship to patient: Self    Best call back number: [phone]    Chief complaint: PT HAD AN APPOINTMENT SCHEDULED FOR 01.02.24 AND HAD TO CANCEL DUE TO INSURANCE. SHE WOULD LIKE TO GET THAT RESCHEDULED. HUB NEEDS FURTHER REVIEW. PLEASE REACH OUT TO PT TO SCHEDULE.     Type of visit: FOLLOW UP    Requested date: ASAP    If rescheduling, when is the original appointment: 01.02.24    Additional notes:

## 2024-04-23 ENCOUNTER — OFFICE VISIT (OUTPATIENT)
Dept: CARDIOLOGY | Facility: CLINIC | Age: 74
End: 2024-04-23
Payer: MEDICARE

## 2024-04-23 VITALS
DIASTOLIC BLOOD PRESSURE: 70 MMHG | OXYGEN SATURATION: 95 % | HEIGHT: 65 IN | WEIGHT: 138 LBS | HEART RATE: 78 BPM | SYSTOLIC BLOOD PRESSURE: 106 MMHG | BODY MASS INDEX: 22.99 KG/M2

## 2024-04-23 DIAGNOSIS — I44.39 HIGH DEGREE ATRIOVENTRICULAR BLOCK: Primary | ICD-10-CM

## 2024-04-23 DIAGNOSIS — Z95.0 PRESENCE OF CARDIAC PACEMAKER: ICD-10-CM

## 2024-04-23 DIAGNOSIS — R53.83 FATIGUE, UNSPECIFIED TYPE: ICD-10-CM

## 2024-04-23 DIAGNOSIS — R06.09 DYSPNEA ON EXERTION: ICD-10-CM

## 2024-04-23 RX ORDER — METHOCARBAMOL 500 MG/1
TABLET, FILM COATED ORAL
COMMUNITY
Start: 2024-03-29

## 2024-04-23 RX ORDER — IBANDRONATE SODIUM 150 MG/1
TABLET, FILM COATED ORAL
COMMUNITY
Start: 2024-04-04

## 2024-04-23 NOTE — PROGRESS NOTES
Cardiac Electrophysiology Outpatient Note  [facility] at [facility]    Office Visit     Haven Koenig  [id number]  04/23/2024    Primary Care Physician: Kajal Snyder MD    Referred By: No ref. provider found    Subjective     Chief Complaint   Patient presents with    Pericardial effusion, acute     F/U     Problem List:  Transient AV block/ Complete heart block  Status post [place] [employer] dual-chamber PPM 7/2020 Dr. Back  Cardiac tamponade, perforation of RV lead requiring pericardial drain  History of rheumatic fever  COVID-19 12/2020  Surgical history:  Appendectomy   back surgery 2015  Cataract extraction  Tonsillectomy    History of Present Illness:   Haven Koenig is a 73 y.o. female who presents to my electrophysiology clinic for follow up of transient complete heart block status post dual-chamber pacemaker implant which was complicated by perforation of the RV lead requiring a pericardial drain. She was last seen in the office about 1 year ago. She has done fairly well from a cardiac standpoint since then.  She lives independently.  She does all her household chores.  She does all of her landscaping and lawn mowing.  She is laying down the hardwood floors in her house by yourself.  She does have some fatigue and dyspnea and she really exerts herself, but denies any chest pain, palpitations, lightheadedness/dizziness, edema or syncopal episode.    Past Medical History:   Diagnosis Date    Diabetes mellitus     H/O active rheumatic fever     Heart block atrioventricular     has pacemaker - followed by Dr. Rangel    History of transfusion     after pacemeaker surgery; denies reaction    Hyperlipidemia     Hypertension     PONV (postoperative nausea and vomiting)     Wears glasses     not since cataract/lens implantation surgery       Past Surgical History:   Procedure Laterality Date    APPENDECTOMY      BACK SURGERY  2015    CATARACT EXTRACTION      COLONOSCOPY      last  "one in the 1990's    INSERT / REPLACE / REMOVE PACEMAKER  7/20    PACEMAKER IMPLANTATION  2020    TONSILLECTOMY         Family History   Problem Relation Age of Onset    No Known Problems Father     Rheum arthritis Sister     Stroke Sister     Arrhythmia Brother     Cancer Brother     Heart disease Brother     No Known Problems Maternal Grandfather     No Known Problems Paternal Grandmother     Arrhythmia Paternal Grandfather     Stroke Paternal Grandfather     Heart attack Paternal Grandfather     Colon cancer Neg Hx        Social History     Socioeconomic History    Marital status:    Tobacco Use    Smoking status: Never     Passive exposure: Never    Smokeless tobacco: Never   Vaping Use    Vaping status: Never Used   Substance and Sexual Activity    Alcohol use: Never    Drug use: Never    Sexual activity: Not Currently     Partners: Male     Birth control/protection: Birth control pill     Comment:          Current Outpatient Medications:     atorvastatin (LIPITOR) 20 MG tablet, Take 1 tablet by mouth Daily., Disp: , Rfl:     ibandronate (BONIVA) 150 MG tablet, TAKE 1 TAB BY MOUTH MONTHLY, Disp: , Rfl:     loratadine (CLARITIN) 10 MG tablet, Take 1 tablet by mouth As Needed., Disp: , Rfl:     metFORMIN ER (GLUCOPHAGE-XR) 500 MG 24 hr tablet, Take 1 tablet by mouth Daily., Disp: , Rfl:     methocarbamol (ROBAXIN) 500 MG tablet, TAKE 1-2 TABLETS BY MOUTH FOUR TIMES A DAY AS NEEDED, Disp: , Rfl:     promethazine (PHENERGAN) 12.5 MG tablet, Take 1 tablet by mouth As Needed., Disp: , Rfl:     propranolol (INDERAL) 20 MG tablet, Take 1 tablet by mouth 2 (Two) Times a Day. pt states usually takes once per day, Disp: , Rfl:     venlafaxine XR (EFFEXOR-XR) 75 MG 24 hr capsule, Take 1 capsule by mouth Daily., Disp: , Rfl:     Allergies:   No Known Allergies    Objective   Vital Signs: Blood pressure 106/70, pulse 78, height 163.8 cm (64.5\"), weight 62.6 kg (138 lb), SpO2 95%.    PHYSICAL EXAM  General " "appearance: Awake, alert, cooperative  Head: Normocephalic, without obvious abnormality, atraumatic  Neck: No JVD  Lungs: Clear to ascultation bilaterally  Heart: Regular rate and rhythm, 1/6 holosystolic murmur noted, no lower extremity swelling  Skin: Skin color, turgor normal, no rashes or lesions  Neurologic: Grossly normal     Lab Results   Component Value Date    GLUCOSE 92 11/29/2023    CALCIUM 9.6 11/29/2023     11/29/2023    K 4.6 11/29/2023    CO2 29.3 (H) 11/29/2023     11/29/2023    BUN 12 11/29/2023    CREATININE 0.78 11/29/2023    EGFRIFNONA 71 01/13/2022    BCR 15.4 11/29/2023    ANIONGAP 9.7 11/29/2023     Lab Results   Component Value Date    WBC 7.08 11/29/2023    HGB 13.8 11/29/2023    HCT 41.1 11/29/2023    MCV 90.9 11/29/2023     11/29/2023     No results found for: \"INR\", \"PROTIME\"  No results found for: \"TSH\", \"D6YMGMD\", \"K9TZTOB\", \"THYROIDAB\"       Results for orders placed during the hospital encounter of 12/02/22    Adult Transthoracic Echo Complete W/ Cont if Necessary Per Protocol    Interpretation Summary    Left ventricular systolic function is normal. Left ventricular ejection fraction appears to be 51 - 55%.    Left atrial volume is moderately increased.    Mild mitral valve regurgitation is present.    Mild tricuspid valve regurgitation is present         I personally viewed and interpreted the patient's EKG/Telemetry/lab data      ECG 12 Lead    Date/Time: 4/24/2024 10:34 PM  Performed by: Kunal Watts PA-C    Authorized by: Kunal Watts PA-C  Comparison: compared with previous ECG from 4/26/2022  Comparison to previous ECG: P-wave to v-pacing interval has increased significantly  Rhythm comments: atrial sensed-ventricular paced rhythm with prolonged AV conduction    Clinical impression: abnormal EKG          Haven Koenig  reports that she has never smoked. She has never been exposed to tobacco smoke. She has never used smokeless tobacco.          Advance " Care Planning   Advance Care Planning: ACP discussion was declined by the patient. Patient does not have an advance directive, information provided.     Assessment & Plan    1. High degree atrioventricular block  S/p DC PPM now with 100% V-pacing    2. Presence of cardiac pacemaker  Device check on [employer] dual-chamber pacemaker revealed a normally functioning device with 10% atrial pacing and nearly 100% ventricular pacing.  Threshold impedance values normal.  5 years left on the battery.  No events.  Patient is experiencing fatigue and has a very long AV interval suspected to minimize pacing at some point in the past but she has been dependent on ventricular pacing for nearly 2 years now so we will tighten up to AV delay and increase her accelerometer to 11 and an attempt to decrease fatigue.    3. Fatigue/Dyspnea on Exertion  We made some changes to pacemaker as noted above that will help with this. We will document an echocardiogram to rule out pacing induced LV dysfunction in setting of dependence on ventricular pacing for last 1-2 years and symptoms of fatigue and dyspnea on exertion. Will have this done at [facility] for convenience. Patient agreeable to plan.  -echocardiogram, complete       Follow Up:  Return in about 9 months (around 1/23/2025).      Thank you for allowing me to participate in the care of your patient. Please do not hesitate to contact me with additional questions or concerns.      Kunal Watts PA-C  Cardiac Electrophysiology  [facility] / [facility]

## 2024-04-24 PROBLEM — R06.09 DYSPNEA ON EXERTION: Status: ACTIVE | Noted: 2024-04-24

## 2024-05-06 ENCOUNTER — HOSPITAL ENCOUNTER (OUTPATIENT)
Dept: CARDIOLOGY | Facility: HOSPITAL | Age: 74
Discharge: HOME | End: 2024-05-06
Payer: MEDICARE

## 2024-05-06 ENCOUNTER — TELEPHONE (OUTPATIENT)
Dept: CARDIOLOGY | Facility: CLINIC | Age: 74
End: 2024-05-06
Payer: MEDICARE

## 2024-05-06 DIAGNOSIS — I44.39 HIGH DEGREE ATRIOVENTRICULAR BLOCK: ICD-10-CM

## 2024-05-06 DIAGNOSIS — R53.83 FATIGUE, UNSPECIFIED TYPE: ICD-10-CM

## 2024-05-06 LAB
BH CV ECHO MEAS - ACS: 1.99 CM
BH CV ECHO MEAS - AO MAX PG: 3.9 MMHG
BH CV ECHO MEAS - AO MEAN PG: 2.21 MMHG
BH CV ECHO MEAS - AO ROOT DIAM: 2.9 CM
BH CV ECHO MEAS - AO V2 MAX: 99.1 CM/SEC
BH CV ECHO MEAS - AO V2 VTI: 20.7 CM
BH CV ECHO MEAS - EDV(CUBED): 70.4 ML
BH CV ECHO MEAS - EDV(MOD-SP4): 59.1 ML
BH CV ECHO MEAS - EF(MOD-SP4): 65.1 %
BH CV ECHO MEAS - EF_3D-VOL: 59 %
BH CV ECHO MEAS - ESV(CUBED): 15.3 ML
BH CV ECHO MEAS - ESV(MOD-SP4): 20.6 ML
BH CV ECHO MEAS - FS: 40 %
BH CV ECHO MEAS - IVS/LVPW: 0.89 CM
BH CV ECHO MEAS - IVSD: 1.12 CM
BH CV ECHO MEAS - LA DIMENSION: 3.4 CM
BH CV ECHO MEAS - LAT PEAK E' VEL: 7.4 CM/SEC
BH CV ECHO MEAS - LV DIASTOLIC VOL/BSA (35-75): 35.4 CM2
BH CV ECHO MEAS - LV MASS(C)D: 171.5 GRAMS
BH CV ECHO MEAS - LV SYSTOLIC VOL/BSA (12-30): 12.3 CM2
BH CV ECHO MEAS - LVIDD: 4.1 CM
BH CV ECHO MEAS - LVIDS: 2.48 CM
BH CV ECHO MEAS - LVPWD: 1.26 CM
BH CV ECHO MEAS - MED PEAK E' VEL: 5.7 CM/SEC
BH CV ECHO MEAS - MV A MAX VEL: 80.6 CM/SEC
BH CV ECHO MEAS - MV DEC SLOPE: 213 CM/SEC2
BH CV ECHO MEAS - MV E MAX VEL: 54 CM/SEC
BH CV ECHO MEAS - MV E/A: 0.67
BH CV ECHO MEAS - RAP SYSTOLE: 10 MMHG
BH CV ECHO MEAS - RVDD: 3.1 CM
BH CV ECHO MEAS - RVSP: 29.8 MMHG
BH CV ECHO MEAS - SV(MOD-SP4): 38.5 ML
BH CV ECHO MEAS - SVI(MOD-SP4): 23 ML/M2
BH CV ECHO MEAS - TR MAX PG: 19.8 MMHG
BH CV ECHO MEAS - TR MAX VEL: 222.6 CM/SEC
BH CV ECHO MEASUREMENTS AVERAGE E/E' RATIO: 8.24
LEFT ATRIUM VOLUME INDEX: 25.7 ML/M2

## 2024-05-06 PROCEDURE — 93306 TTE W/DOPPLER COMPLETE: CPT | Performed by: SPECIALIST

## 2024-05-06 PROCEDURE — 93306 TTE W/DOPPLER COMPLETE: CPT

## 2024-09-18 PROCEDURE — 93294 REM INTERROG EVL PM/LDLS PM: CPT | Performed by: STUDENT IN AN ORGANIZED HEALTH CARE EDUCATION/TRAINING PROGRAM

## 2024-09-18 PROCEDURE — 93296 REM INTERROG EVL PM/IDS: CPT | Performed by: STUDENT IN AN ORGANIZED HEALTH CARE EDUCATION/TRAINING PROGRAM

## 2024-09-24 ENCOUNTER — TELEPHONE (OUTPATIENT)
Dept: CARDIOLOGY | Facility: CLINIC | Age: 74
End: 2024-09-24
Payer: MEDICARE

## 2024-10-03 ENCOUNTER — OFFICE VISIT (OUTPATIENT)
Dept: CARDIOLOGY | Facility: CLINIC | Age: 74
End: 2024-10-03
Payer: MEDICARE

## 2024-10-03 VITALS
DIASTOLIC BLOOD PRESSURE: 78 MMHG | BODY MASS INDEX: 22.49 KG/M2 | SYSTOLIC BLOOD PRESSURE: 140 MMHG | HEIGHT: 65 IN | OXYGEN SATURATION: 97 % | HEART RATE: 93 BPM | WEIGHT: 135 LBS

## 2024-10-03 DIAGNOSIS — I44.39 HIGH DEGREE ATRIOVENTRICULAR BLOCK: Primary | ICD-10-CM

## 2024-10-03 NOTE — PROGRESS NOTES
Cardiac Electrophysiology Outpatient Note  [facility]    Office Visit     Haven Koenig  [id number]      Primary Care Physician: Kajal Snyder MD        Subjective     Chief Complaint   Patient presents with    Pericardial effusion, acute     Problem List:  Transient AV block/ Complete heart block  Status post [employer] dual-chamber PPM 7/2020 Dr. Back  Cardiac tamponade, perforation of RV lead requiring pericardial drain  Echocardiogram EF 65% right atrial cavity is borderline dilated diastolic dysfunction borderline LVH  History of rheumatic fever  COVID-19 12/2020  Surgical history:  Appendectomy   back surgery 2015  Cataract extraction  Tonsillectomy    History of Present Illness:   Haven Koenig is a 74 y.o. female who presents to  electrophysiology clinic for follow up of transient complete heart block status post dual-chamber pacemaker implant which was complicated by perforation of the RV lead requiring a pericardial drain.  She was last seen on April 24, 2024.  She is here today as she tells me she may need breast lumpectomy for breast cancer.  She wanted be checked today to make that everything was okay.  She is not having chest pain dizziness near syncope.  She recent had ultrasound of heart that was normal.      Past Medical History:   Diagnosis Date    Breast cancer     Diabetes mellitus     H/O active rheumatic fever     Heart block atrioventricular     has pacemaker - followed by Dr. Rangel    History of transfusion     after pacemeaker surgery; denies reaction    Hyperlipidemia     Hypertension     PONV (postoperative nausea and vomiting)     Wears glasses     not since cataract/lens implantation surgery       Past Surgical History:   Procedure Laterality Date    APPENDECTOMY      BACK SURGERY  2015    CATARACT EXTRACTION      COLONOSCOPY      last one in the 1990's    INSERT / REPLACE / REMOVE PACEMAKER  7/20    PACEMAKER IMPLANTATION  2020  "   TONSILLECTOMY         Family History   Problem Relation Age of Onset    No Known Problems Father     Rheum arthritis Sister     Stroke Sister     Arrhythmia Brother     Cancer Brother     Heart disease Brother     No Known Problems Maternal Grandfather     No Known Problems Paternal Grandmother     Arrhythmia Paternal Grandfather     Stroke Paternal Grandfather     Heart attack Paternal Grandfather     Colon cancer Neg Hx        Social History     Socioeconomic History    Marital status:    Tobacco Use    Smoking status: Never     Passive exposure: Never    Smokeless tobacco: Never   Vaping Use    Vaping status: Never Used   Substance and Sexual Activity    Alcohol use: Never    Drug use: Never    Sexual activity: Not Currently     Partners: Male     Birth control/protection: Birth control pill     Comment:          Current Outpatient Medications:     atorvastatin (LIPITOR) 20 MG tablet, Take 1 tablet by mouth Daily., Disp: , Rfl:     ibandronate (BONIVA) 150 MG tablet, TAKE 1 TAB BY MOUTH MONTHLY, Disp: , Rfl:     loratadine (CLARITIN) 10 MG tablet, Take 1 tablet by mouth As Needed., Disp: , Rfl:     metFORMIN ER (GLUCOPHAGE-XR) 500 MG 24 hr tablet, Take 1 tablet by mouth Daily., Disp: , Rfl:     promethazine (PHENERGAN) 12.5 MG tablet, Take 1 tablet by mouth As Needed., Disp: , Rfl:     propranolol (INDERAL) 20 MG tablet, Take 1 tablet by mouth 2 (Two) Times a Day. pt states usually takes once per day, Disp: , Rfl:     venlafaxine XR (EFFEXOR-XR) 75 MG 24 hr capsule, Take 1 capsule by mouth Daily., Disp: , Rfl:     methocarbamol (ROBAXIN) 500 MG tablet, TAKE 1-2 TABLETS BY MOUTH FOUR TIMES A DAY AS NEEDED (Patient not taking: Reported on 10/3/2024), Disp: , Rfl:     Allergies:   No Known Allergies    Objective   Vital Signs: Blood pressure 140/78, pulse 93, height 165.1 cm (65\"), weight 61.2 kg (135 lb), SpO2 97%.    PHYSICAL EXAM  General appearance: Awake, alert, cooperative  Head: " "Normocephalic, without obvious abnormality, atraumatic  Neck: No JVD  Lungs: Clear to ascultation bilaterally  Heart: Regular rate and rhythm, 1/6 holosystolic murmur noted, no lower extremity swelling  Skin: Skin color, turgor normal, no rashes or lesions  Neurologic: Grossly normal     Lab Results   Component Value Date    GLUCOSE 92 11/29/2023    CALCIUM 9.6 11/29/2023     11/29/2023    K 4.6 11/29/2023    CO2 29.3 (H) 11/29/2023     11/29/2023    BUN 12 11/29/2023    CREATININE 0.78 11/29/2023    EGFRIFNONA 71 01/13/2022    BCR 15.4 11/29/2023    ANIONGAP 9.7 11/29/2023     Lab Results   Component Value Date    WBC 7.08 11/29/2023    HGB 13.8 11/29/2023    HCT 41.1 11/29/2023    MCV 90.9 11/29/2023     11/29/2023     No results found for: \"INR\", \"PROTIME\"  No results found for: \"TSH\", \"G4TSCGR\", \"Y2YQOKK\", \"THYROIDAB\"       Results for orders placed during the hospital encounter of 05/06/24    Adult Transthoracic Echo Complete W/ Cont if Necessary Per Protocol    Interpretation Summary    Left ventricular systolic function is normal. Left ventricular ejection fraction appears to be 61 - 65%.    Left ventricular wall thickness is consistent with borderline concentric hypertrophy.    Left ventricular diastolic function is consistent with (grade I) impaired relaxation.    The right atrial cavity is borderline dilated.    Estimated right ventricular systolic pressure from tricuspid regurgitation is normal (<35 mmHg).    Pacemaker leads were seen at the right atrium and right ventricle.         I personally viewed and interpreted the patient's EKG/Telemetry/lab data    Procedures    Haven Koenig  reports that she has never smoked. She has never been exposed to tobacco smoke. She has never used smokeless tobacco.          Advance Care Planning   Advance Care Planning: ACP discussion was declined by the patient. Patient does not have an advance directive, information provided.     Assessment & Plan  "   1. High degree atrioventricular block  S/p DC PPM now with 100% V-pacing.  I    2. Presence of cardiac pacemaker  [employer] pacemaker.  DDDR at 60.  A paced 2%.  RV paced 9 9%.  Normal thresholds impedance.  Battery voltage 4.5 years.    3.  Recent normal echocardiogram February 2024.    4.  Breast cancer, plan for left lumpectomy possible radiation therapy.      Electronically signed by SENG Baltazar, 10/03/24, 12:03 PM EDT.

## 2024-11-07 ENCOUNTER — TELEPHONE (OUTPATIENT)
Dept: CARDIOLOGY | Facility: CLINIC | Age: 74
End: 2024-11-07
Payer: MEDICARE

## 2024-11-07 NOTE — TELEPHONE ENCOUNTER
Caller: HONORIOSAMIA     Relationship: DAUGHTER    Best call back number: [phone]    What is your medical concern?  AT [facility] - PHONE - [phone].   IS TREATING THE PATIENT FOR A RECENT BREAST CANCER DIAGNOSIS. THEY NEED TO DO RADIATION THERAPY BUT IT IS VERY CLOSE TO HER PACEMAKER. THE DR IS CONCERNED THAT IT COULD DAMAGE HER PACEMAKER. THEY NEED TO MOVE FORWARD WITH THIS TREATMENT ASAP. PLEASE REACH OUT TO ADVISE.

## 2024-11-07 NOTE — TELEPHONE ENCOUNTER
Called daughter, the call went straight to voice mail. Left message for Ms. Colon to call the device clinic, call back phone number provided.

## 2024-11-08 NOTE — TELEPHONE ENCOUNTER
Pt is considering radiation therapy for breast cancer. Per pt's daughter, Meghna Colon, the radiation site is on or near the pt's [employer] pacemaker. Per Ms. Colon, the radiation oncologist states the radiation therapy could damage the pacemaker. Ms. Colon asks what kind of damage could occur & if the impact of radiation to the pacemaker is life threatening. Ms. Colon states they are waiting on making a decision about radiation therapy until further info about impact on the pacemaker.     Per last manual device check on 10/3/2024: RV Paced 99%, pt is Dependent.

## 2024-11-08 NOTE — TELEPHONE ENCOUNTER
Shall I make an appointment for her to see you in the near future?     I have called the office of Dr. Ovidio Hoskins @ [facility] to obtain a copy of the treatment plan. At the time of the consult, pt had not agreed to move forward with radiation therapy. The medical staff person spoke with will call back with further information.

## 2024-11-08 NOTE — TELEPHONE ENCOUNTER
Dr. Hoskins's office called the pt's daughter, Ms. Colon. A simulation is scheduled for next Tuesday, 11/12/2024. Per office staff member & pt's daughter, Dr. Hoskins states if the pacemaker needs to be moved for radiation therapy the radiation therapy does not need to be done. Benefit doesn't outweigh risk of moving pacemaker. Ms. Colon will call back after the radiation simulation.

## 2024-12-31 ENCOUNTER — TRANSCRIBE ORDERS (OUTPATIENT)
Dept: LAB | Facility: HOSPITAL | Age: 74
End: 2024-12-31
Payer: MEDICARE

## 2024-12-31 ENCOUNTER — LAB (OUTPATIENT)
Dept: LAB | Facility: HOSPITAL | Age: 74
End: 2024-12-31
Payer: MEDICARE

## 2024-12-31 DIAGNOSIS — E11.9 DIABETES MELLITUS WITHOUT COMPLICATION: ICD-10-CM

## 2024-12-31 DIAGNOSIS — E11.9 DIABETES MELLITUS WITHOUT COMPLICATION: Primary | ICD-10-CM

## 2024-12-31 LAB
ALBUMIN UR-MCNC: 1.6 MG/DL
ANION GAP SERPL CALCULATED.3IONS-SCNC: 10 MMOL/L (ref 5–15)
BUN SERPL-MCNC: 18 MG/DL (ref 8–23)
BUN/CREAT SERPL: 17.8 (ref 7–25)
CALCIUM SPEC-SCNC: 9.7 MG/DL (ref 8.6–10.5)
CHLORIDE SERPL-SCNC: 103 MMOL/L (ref 98–107)
CO2 SERPL-SCNC: 28 MMOL/L (ref 22–29)
CREAT SERPL-MCNC: 1.01 MG/DL (ref 0.57–1)
CREAT UR-MCNC: 135.7 MG/DL
EGFRCR SERPLBLD CKD-EPI 2021: 57.8 ML/MIN/1.73
GLUCOSE SERPL-MCNC: 94 MG/DL (ref 65–99)
MICROALBUMIN/CREAT UR: 11.8 MG/G (ref 0–29)
POTASSIUM SERPL-SCNC: 4.2 MMOL/L (ref 3.5–5.2)
SODIUM SERPL-SCNC: 141 MMOL/L (ref 136–145)

## 2024-12-31 PROCEDURE — 36415 COLL VENOUS BLD VENIPUNCTURE: CPT

## 2024-12-31 PROCEDURE — 82043 UR ALBUMIN QUANTITATIVE: CPT

## 2024-12-31 PROCEDURE — 82570 ASSAY OF URINE CREATININE: CPT

## 2024-12-31 PROCEDURE — 80048 BASIC METABOLIC PNL TOTAL CA: CPT

## 2025-05-27 ENCOUNTER — OFFICE VISIT (OUTPATIENT)
Dept: CARDIOLOGY | Facility: CLINIC | Age: 75
End: 2025-05-27
Payer: MEDICARE

## 2025-05-27 VITALS
BODY MASS INDEX: 21.52 KG/M2 | HEIGHT: 65 IN | WEIGHT: 129.2 LBS | DIASTOLIC BLOOD PRESSURE: 64 MMHG | HEART RATE: 62 BPM | SYSTOLIC BLOOD PRESSURE: 114 MMHG | OXYGEN SATURATION: 95 %

## 2025-05-27 DIAGNOSIS — Z95.0 PRESENCE OF CARDIAC PACEMAKER: Primary | ICD-10-CM

## 2025-05-27 DIAGNOSIS — E11.9 TYPE 2 DIABETES MELLITUS WITHOUT COMPLICATION, WITHOUT LONG-TERM CURRENT USE OF INSULIN: ICD-10-CM

## 2025-05-27 RX ORDER — LETROZOLE 2.5 MG/1
2.5 TABLET, FILM COATED ORAL DAILY
COMMUNITY
Start: 2024-10-28

## 2025-05-30 NOTE — PROGRESS NOTES
Cardiac Electrophysiology Outpatient Note  [facility] at [facility]    Office Visit     Haven Koenig  [id number]      Primary Care Physician: Kajal Snyder MD        Subjective     Chief Complaint   Patient presents with    High degree atrioventricular block     Pt says she has shortness of breath with exertion      Problem List:  Transient AV block/ Complete heart block  Status post [employer] dual-chamber PPM 7/2020 Dr. Back  Cardiac tamponade, perforation of RV lead requiring pericardial drain  Echocardiogram EF 65% right atrial cavity is borderline dilated diastolic dysfunction borderline LVH  History of rheumatic fever  COVID-19 12/2020  Surgical history:  Appendectomy   back surgery 2015  Cataract extraction  Tonsillectomy    History of Present Illness:   Haven Koenig is a 74 y.o. female who presents to  electrophysiology clinic for follow up of transient complete heart block status post dual-chamber pacemaker implant which was complicated by perforation of the RV lead requiring a pericardial drain.  This was performed by Dr. Humphreys in 2020.      Since her last visit she is overall doing well.  Denies any major issues.  Notices mild shortness of breath.  She still able to push mow a large yard without issue.  She did undergo lumpectomy and radiation for breast cancer which is overall gone well.  Takes propranolol for Tendril tremor    Past Medical History:   Diagnosis Date    Breast cancer     Diabetes mellitus     H/O active rheumatic fever     Heart block atrioventricular     has pacemaker - followed by Dr. Rangel    History of transfusion     after pacemeaker surgery; denies reaction    Hyperlipidemia     Hypertension     PONV (postoperative nausea and vomiting)     Wears glasses     not since cataract/lens implantation surgery       Past Surgical History:   Procedure Laterality Date    APPENDECTOMY      BACK SURGERY  2015    CATARACT EXTRACTION      COLONOSCOPY   DATE OF PROCEDURE:  07/01/2020

 

SURGEON:  Cosme Bond MD

 

PROCEDURE:  EGD with biopsies.

 

INDICATIONS FOR EGD:  Anemia, history of melena.

 

MEDICATIONS:  The patient was done under MAC, please see anesthesiologist's note.

 

PROCEDURE IN DETAIL:  With the patient in left lateral decubitus position, a flexible

fiberoptic Olympus gastroscope was introduced into the esophagus under direct

visualization without any difficulty.  There was some patchy erythema noted in distal

esophagus.  There was some patchy erythema noted in the distal esophagus.  The scope was

then advanced with ease into the stomach and approximately three ulcers were noted in

the antrum; some with heaped up margins without active bleeding, one ulcer was biopsied.

 The mucosa revealed some patchy erythema and mild-to-moderate edema, and biopsies were

obtained and sent to stain for H. pylori.  Pylorus was of normal contour and shape, it

was intubated with ease and the scope was advanced all the way to the second portion of

the duodenum.  Mucosa overlying the proximal second portion appeared to be within normal

limits.  Mucosa overlying the duodenal bulb revealed some diffuse some patchy intense

erythema.  The scope was then withdrawn back into the stomach and retroflexed, mucosa

overlying the fundus and cardia appeared to be within normal limits.  The scope was then

straightened out, it was subsequently withdrawn.  The patient tolerated the procedure

well. 

 

IMPRESSION:  

1. Distal esophagitis, mild.

2. Gastritis, biopsied.  Biopsies sent to stain for H. pylori.

3. Gastric ulcers, antrum, largest up to 6 mm in size, some with heaped up margins

without active bleeding, biopsied. 

4. Bulbar duodenitis.

 

PLAN:  

1. Follow up histology.

2. Continue PPI therapy.

3. The patient will need to have a repeat EGD in 2 months to re-evaluate the ulcers and

document healing. 

4. Also, the patient will also need a colonoscopy, which can be done on an outpatient

basis. 

 

 

 

 

______________________________

Cosme Bond MD

 

[facility] [place]

D:  07/01/2020 11:27:06

T:  07/01/2020 12:47:49

Job #:  [id number]

 

cc:            MD Charles Chester MD Louis M Hamer, MD     last one in the 1990's    INSERT / REPLACE / REMOVE PACEMAKER  7/20    PACEMAKER IMPLANTATION  2020    TONSILLECTOMY         Family History   Problem Relation Age of Onset    No Known Problems Father     Rheum arthritis Sister     Stroke Sister     Arrhythmia Brother     Cancer Brother     Heart disease Brother     No Known Problems Maternal Grandfather     No Known Problems Paternal Grandmother     Arrhythmia Paternal Grandfather     Stroke Paternal Grandfather     Heart attack Paternal Grandfather     Colon cancer Neg Hx        Social History     Socioeconomic History    Marital status:    Tobacco Use    Smoking status: Never     Passive exposure: Never    Smokeless tobacco: Never   Vaping Use    Vaping status: Never Used   Substance and Sexual Activity    Alcohol use: Never    Drug use: Never    Sexual activity: Not Currently     Partners: Male     Birth control/protection: Birth control pill     Comment:          Current Outpatient Medications:     letrozole (FEMARA) 2.5 MG tablet, Take 1 tablet by mouth Daily., Disp: , Rfl:     atorvastatin (LIPITOR) 20 MG tablet, Take 1 tablet by mouth Daily., Disp: , Rfl:     ibandronate (BONIVA) 150 MG tablet, TAKE 1 TAB BY MOUTH MONTHLY, Disp: , Rfl:     loratadine (CLARITIN) 10 MG tablet, Take 1 tablet by mouth As Needed., Disp: , Rfl:     metFORMIN ER (GLUCOPHAGE-XR) 500 MG 24 hr tablet, Take 1 tablet by mouth Daily., Disp: , Rfl:     methocarbamol (ROBAXIN) 500 MG tablet, TAKE 1-2 TABLETS BY MOUTH FOUR TIMES A DAY AS NEEDED (Patient not taking: Reported on 10/3/2024), Disp: , Rfl:     promethazine (PHENERGAN) 12.5 MG tablet, Take 1 tablet by mouth As Needed., Disp: , Rfl:     propranolol (INDERAL) 20 MG tablet, Take 1 tablet by mouth 2 (Two) Times a Day. pt states usually takes once per day, Disp: , Rfl:     venlafaxine XR (EFFEXOR-XR) 75 MG 24 hr capsule, Take 1 capsule by mouth Daily., Disp: , Rfl:     Allergies:   No Known Allergies    Objective  "  Vital Signs: Blood pressure 114/64, pulse 62, height 165.1 cm (65\"), weight 58.6 kg (129 lb 3.2 oz), SpO2 95%.    PHYSICAL EXAM  General appearance: Awake, alert, cooperative  Head: Normocephalic, without obvious abnormality, atraumatic  Neck: No JVD  Lungs: Clear to ascultation bilaterally  Heart: Regular rate and rhythm, 1/6 holosystolic murmur noted, no lower extremity swelling  Skin: Skin color, turgor normal, no rashes or lesions  Neurologic: Grossly normal     Lab Results   Component Value Date    GLUCOSE 94 12/31/2024    CALCIUM 9.7 12/31/2024     12/31/2024    K 4.2 12/31/2024    CO2 28.0 12/31/2024     12/31/2024    BUN 18 12/31/2024    CREATININE 1.01 (H) 12/31/2024    EGFRIFNONA 71 01/13/2022    BCR 17.8 12/31/2024    ANIONGAP 10.0 12/31/2024     Lab Results   Component Value Date    WBC 7.08 11/29/2023    HGB 13.8 11/29/2023    HCT 41.1 11/29/2023    MCV 90.9 11/29/2023     11/29/2023     No results found for: \"INR\", \"PROTIME\"  No results found for: \"TSH\", \"F6QKMOM\", \"D8SUTYN\", \"THYROIDAB\"       Results for orders placed during the hospital encounter of 05/06/24    Adult Transthoracic Echo Complete W/ Cont if Necessary Per Protocol    Interpretation Summary    Left ventricular systolic function is normal. Left ventricular ejection fraction appears to be 61 - 65%.    Left ventricular wall thickness is consistent with borderline concentric hypertrophy.    Left ventricular diastolic function is consistent with (grade I) impaired relaxation.    The right atrial cavity is borderline dilated.    Estimated right ventricular systolic pressure from tricuspid regurgitation is normal (<35 mmHg).    Pacemaker leads were seen at the right atrium and right ventricle.         I personally viewed and interpreted the patient's EKG/Telemetry/lab data      ECG 12 Lead    Date/Time: 5/30/2025 4:03 PM  Performed by: John Rangel MD    Authorized by: John Rangel MD  Comparison: compared with previous " ECG from 4/23/2024  Similar to previous ECG  Comments: Atrial sensed, ventricular paced rhythm        The 10-year ASCVD risk score (Jacquie DK, et al., 2019) is: 25.5%    Values used to calculate the score:      Age: 74 years      Sex: Female      Is Non- : No      Diabetic: Yes      Tobacco smoker: No      Systolic Blood Pressure: 114 mmHg      Is BP treated: No      HDL Cholesterol: 42 mg/dL      Total Cholesterol: 160 mg/dL    Risk Factors: Diabetes  Intervention: None, most recent LDL is at goal on Lipitor  Time Spent: 5 minutes      Haven Koenig  reports that she has never smoked. She has never been exposed to tobacco smoke. She has never used smokeless tobacco.          Advance Care Planning   Advance Care Planning: ACP discussion was declined by the patient. Patient does not have an advance directive, information provided.     Assessment & Plan    1. High degree atrioventricular block  S/p DC PPM now with 100% V-pacing.    2. Presence of cardiac pacemaker  [employer] pacemaker.  Initially paced by Dr. Back in 2020 which was complicated by RV lead perforation requiring pericardial drainage.     Device was interrogated today and functioning normally.  She has approximately 4.5 years of battery life remaining.  Pacing 6% of time of the atrium and 100% of time in the ventricle.  Minute ventilation was turned back on today but otherwise no significant setting changes.          Electronically signed by SENG Baltazar, 10/03/24, 12:03 PM EDT.

## 2025-06-23 LAB
MC_CV_MDC_IDC_RATE_1: 160
MC_CV_MDC_IDC_ZONE_ID: 1
MDC_IDC_MSMT_BATTERY_REMAINING_LONGEVITY: 42 MO
MDC_IDC_MSMT_BATTERY_REMAINING_PERCENTAGE: 73 %
MDC_IDC_MSMT_BATTERY_STATUS: NORMAL
MDC_IDC_MSMT_LEADCHNL_RA_DTM: NORMAL
MDC_IDC_MSMT_LEADCHNL_RA_IMPEDANCE_VALUE: 575
MDC_IDC_MSMT_LEADCHNL_RA_PACING_THRESHOLD_POLARITY: NORMAL
MDC_IDC_MSMT_LEADCHNL_RA_SENSING_INTR_AMPL: 4.1
MDC_IDC_MSMT_LEADCHNL_RV_DTM: NORMAL
MDC_IDC_MSMT_LEADCHNL_RV_IMPEDANCE_VALUE: 687
MDC_IDC_MSMT_LEADCHNL_RV_PACING_THRESHOLD_POLARITY: NORMAL
MDC_IDC_PG_IMPLANT_DTM: NORMAL
MDC_IDC_PG_MFG: NORMAL
MDC_IDC_PG_MODEL: NORMAL
MDC_IDC_PG_SERIAL: NORMAL
MDC_IDC_PG_TYPE: NORMAL
MDC_IDC_SESS_DTM: NORMAL
MDC_IDC_SESS_TYPE: NORMAL
MDC_IDC_SET_BRADY_AT_MODE_SWITCH_RATE: 170
MDC_IDC_SET_BRADY_LOWRATE: 60
MDC_IDC_SET_BRADY_MAX_SENSOR_RATE: 145
MDC_IDC_SET_BRADY_MAX_TRACKING_RATE: 130
MDC_IDC_SET_BRADY_MODE: NORMAL
MDC_IDC_SET_BRADY_PAV_DELAY: 120
MDC_IDC_SET_BRADY_SAV_DELAY: 120
MDC_IDC_SET_LEADCHNL_RA_PACING_AMPLITUDE: 2
MDC_IDC_SET_LEADCHNL_RA_PACING_POLARITY: NORMAL
MDC_IDC_SET_LEADCHNL_RA_PACING_PULSEWIDTH: 0.4
MDC_IDC_SET_LEADCHNL_RA_SENSING_POLARITY: NORMAL
MDC_IDC_SET_LEADCHNL_RA_SENSING_SENSITIVITY: 0.25
MDC_IDC_SET_LEADCHNL_RV_PACING_AMPLITUDE: 2.2
MDC_IDC_SET_LEADCHNL_RV_PACING_POLARITY: NORMAL
MDC_IDC_SET_LEADCHNL_RV_PACING_PULSEWIDTH: 0.4
MDC_IDC_SET_LEADCHNL_RV_SENSING_POLARITY: NORMAL
MDC_IDC_SET_LEADCHNL_RV_SENSING_SENSITIVITY: 0.6
MDC_IDC_SET_ZONE_STATUS: NORMAL
MDC_IDC_SET_ZONE_TYPE: NORMAL
MDC_IDC_STAT_AT_BURDEN_PERCENT: 0
MDC_IDC_STAT_BRADY_RA_PERCENT_PACED: 38
MDC_IDC_STAT_BRADY_RV_PERCENT_PACED: 100